# Patient Record
Sex: FEMALE | Race: WHITE | Employment: OTHER | URBAN - METROPOLITAN AREA
[De-identification: names, ages, dates, MRNs, and addresses within clinical notes are randomized per-mention and may not be internally consistent; named-entity substitution may affect disease eponyms.]

---

## 2018-04-27 PROBLEM — R32 URINARY INCONTINENCE: Status: ACTIVE | Noted: 2017-01-19

## 2018-05-10 PROBLEM — E03.9 ACQUIRED HYPOTHYROIDISM: Status: ACTIVE | Noted: 2018-05-10

## 2018-05-10 PROBLEM — K21.9 GASTROESOPHAGEAL REFLUX DISEASE WITHOUT ESOPHAGITIS: Status: ACTIVE | Noted: 2018-05-10

## 2018-05-10 PROBLEM — E11.22 TYPE 2 DIABETES MELLITUS WITH STAGE 3 CHRONIC KIDNEY DISEASE, WITHOUT LONG-TERM CURRENT USE OF INSULIN (HCC): Status: ACTIVE | Noted: 2018-05-10

## 2018-05-10 PROBLEM — E78.2 MIXED HYPERLIPIDEMIA: Status: ACTIVE | Noted: 2018-05-10

## 2018-05-10 PROBLEM — N18.30 TYPE 2 DIABETES MELLITUS WITH STAGE 3 CHRONIC KIDNEY DISEASE, WITHOUT LONG-TERM CURRENT USE OF INSULIN (HCC): Status: ACTIVE | Noted: 2018-05-10

## 2018-05-10 PROBLEM — I10 ESSENTIAL HYPERTENSION: Status: ACTIVE | Noted: 2018-05-10

## 2018-07-16 ENCOUNTER — HOSPITAL ENCOUNTER (OUTPATIENT)
Age: 73
Setting detail: SPECIMEN
Discharge: HOME | End: 2018-07-16
Payer: MEDICARE

## 2018-07-16 DIAGNOSIS — R19.7 DIARRHEA, UNSPECIFIED TYPE: ICD-10-CM

## 2018-07-16 LAB
CAMPYLOBACTER PCR: NORMAL
DATE, STOOL #1: NORMAL
DATE, STOOL #2: NORMAL
DATE, STOOL #3: NORMAL
HEMOCCULT SP1 STL QL: NEGATIVE
HEMOCCULT SP2 STL QL: NORMAL
HEMOCCULT SP3 STL QL: NORMAL
SALMONELLA PCR: NORMAL
SHIGATOXIN GENE PCR: NORMAL
SHIGELLA SP PCR: NORMAL
SPECIMEN: NORMAL
TIME, STOOL #1: 400
TIME, STOOL #2: NORMAL
TIME, STOOL #3: NORMAL

## 2018-11-26 ENCOUNTER — HOSPITAL ENCOUNTER (OUTPATIENT)
Dept: GENERAL RADIOLOGY | Facility: CLINIC | Age: 73
Discharge: HOME | End: 2018-11-28
Payer: MEDICARE

## 2018-11-26 DIAGNOSIS — G89.29 CHRONIC PAIN OF LEFT KNEE: ICD-10-CM

## 2018-11-26 DIAGNOSIS — M25.562 CHRONIC PAIN OF LEFT KNEE: ICD-10-CM

## 2018-11-26 PROCEDURE — 73564 X-RAY EXAM KNEE 4 OR MORE: CPT

## 2018-12-07 ENCOUNTER — HOSPITAL ENCOUNTER (OUTPATIENT)
Age: 73
Setting detail: SPECIMEN
Discharge: HOME | End: 2018-12-07
Payer: MEDICARE

## 2018-12-07 DIAGNOSIS — R30.0 DYSURIA: ICD-10-CM

## 2018-12-07 DIAGNOSIS — N30.01 ACUTE CYSTITIS WITH HEMATURIA: ICD-10-CM

## 2018-12-08 LAB
CULTURE: ABNORMAL
Lab: ABNORMAL
ORGANISM: ABNORMAL
SPECIMEN DESCRIPTION: ABNORMAL
STATUS: ABNORMAL

## 2018-12-27 ENCOUNTER — HOSPITAL ENCOUNTER (OUTPATIENT)
Age: 73
Setting detail: SPECIMEN
Discharge: HOME | End: 2018-12-27
Payer: MEDICARE

## 2018-12-27 LAB
ANION GAP SERPL CALCULATED.3IONS-SCNC: 13 MMOL/L (ref 9–17)
BUN BLDV-MCNC: 70 MG/DL (ref 8–23)
BUN/CREAT BLD: ABNORMAL (ref 9–20)
CALCIUM SERPL-MCNC: 9.2 MG/DL (ref 8.6–10.4)
CHLORIDE BLD-SCNC: 109 MMOL/L (ref 98–107)
CO2: 20 MMOL/L (ref 20–31)
CREAT SERPL-MCNC: 1.98 MG/DL (ref 0.5–0.9)
GFR AFRICAN AMERICAN: 30 ML/MIN
GFR NON-AFRICAN AMERICAN: 25 ML/MIN
GFR SERPL CREATININE-BSD FRML MDRD: ABNORMAL ML/MIN/{1.73_M2}
GFR SERPL CREATININE-BSD FRML MDRD: ABNORMAL ML/MIN/{1.73_M2}
GLUCOSE BLD-MCNC: 267 MG/DL (ref 70–99)
POTASSIUM SERPL-SCNC: 5.3 MMOL/L (ref 3.7–5.3)
SODIUM BLD-SCNC: 142 MMOL/L (ref 135–144)

## 2019-02-04 ENCOUNTER — HOSPITAL ENCOUNTER (OUTPATIENT)
Age: 74
Setting detail: SPECIMEN
Discharge: HOME | End: 2019-02-04
Payer: MEDICARE

## 2019-02-04 DIAGNOSIS — E03.9 ACQUIRED HYPOTHYROIDISM: ICD-10-CM

## 2019-02-04 DIAGNOSIS — E11.22 TYPE 2 DIABETES MELLITUS WITH STAGE 3 CHRONIC KIDNEY DISEASE, WITHOUT LONG-TERM CURRENT USE OF INSULIN (HCC): ICD-10-CM

## 2019-02-04 DIAGNOSIS — N18.30 TYPE 2 DIABETES MELLITUS WITH STAGE 3 CHRONIC KIDNEY DISEASE, WITHOUT LONG-TERM CURRENT USE OF INSULIN (HCC): ICD-10-CM

## 2019-02-04 LAB
ESTIMATED AVERAGE GLUCOSE: 171 MG/DL
HBA1C MFR BLD: 7.6 % (ref 4–6)
TSH SERPL DL<=0.05 MIU/L-ACNC: 3.71 MIU/L (ref 0.3–5)

## 2019-08-28 ENCOUNTER — HOSPITAL ENCOUNTER (OUTPATIENT)
Age: 74
Setting detail: SPECIMEN
Discharge: HOME | End: 2019-08-28
Payer: MEDICARE

## 2019-08-28 DIAGNOSIS — I10 ESSENTIAL HYPERTENSION: ICD-10-CM

## 2019-08-28 DIAGNOSIS — E03.9 ACQUIRED HYPOTHYROIDISM: ICD-10-CM

## 2019-08-28 DIAGNOSIS — E11.22 TYPE 2 DIABETES MELLITUS WITH STAGE 3 CHRONIC KIDNEY DISEASE, WITHOUT LONG-TERM CURRENT USE OF INSULIN (HCC): ICD-10-CM

## 2019-08-28 DIAGNOSIS — N18.30 TYPE 2 DIABETES MELLITUS WITH STAGE 3 CHRONIC KIDNEY DISEASE, WITHOUT LONG-TERM CURRENT USE OF INSULIN (HCC): ICD-10-CM

## 2019-08-28 LAB
-: ABNORMAL
AMORPHOUS: ABNORMAL
ANION GAP SERPL CALCULATED.3IONS-SCNC: 14 MMOL/L (ref 9–17)
ANION GAP SERPL CALCULATED.3IONS-SCNC: 15 MMOL/L (ref 9–17)
BACTERIA: ABNORMAL
BILIRUBIN URINE: NEGATIVE
BUN BLDV-MCNC: 46 MG/DL (ref 8–23)
BUN BLDV-MCNC: 47 MG/DL (ref 8–23)
BUN/CREAT BLD: ABNORMAL (ref 9–20)
BUN/CREAT BLD: ABNORMAL (ref 9–20)
CALCIUM SERPL-MCNC: 9.4 MG/DL (ref 8.6–10.4)
CALCIUM SERPL-MCNC: 9.4 MG/DL (ref 8.6–10.4)
CASTS UA: ABNORMAL /LPF (ref 0–8)
CHLORIDE BLD-SCNC: 107 MMOL/L (ref 98–107)
CHLORIDE BLD-SCNC: 111 MMOL/L (ref 98–107)
CO2: 18 MMOL/L (ref 20–31)
CO2: 19 MMOL/L (ref 20–31)
COLOR: YELLOW
COMMENT UA: ABNORMAL
CREAT SERPL-MCNC: 2.06 MG/DL (ref 0.5–0.9)
CREAT SERPL-MCNC: 2.11 MG/DL (ref 0.5–0.9)
CRYSTALS, UA: ABNORMAL /HPF
EPITHELIAL CELLS UA: ABNORMAL /HPF (ref 0–5)
ESTIMATED AVERAGE GLUCOSE: 174 MG/DL
GFR AFRICAN AMERICAN: 28 ML/MIN
GFR AFRICAN AMERICAN: 28 ML/MIN
GFR NON-AFRICAN AMERICAN: 23 ML/MIN
GFR NON-AFRICAN AMERICAN: 23 ML/MIN
GFR SERPL CREATININE-BSD FRML MDRD: ABNORMAL ML/MIN/{1.73_M2}
GLUCOSE BLD-MCNC: 131 MG/DL (ref 70–99)
GLUCOSE BLD-MCNC: 134 MG/DL (ref 70–99)
GLUCOSE URINE: NEGATIVE
HBA1C MFR BLD: 7.7 % (ref 4–6)
HCT VFR BLD CALC: 34.8 % (ref 36.3–47.1)
HEMOGLOBIN: 11 G/DL (ref 11.9–15.1)
KETONES, URINE: NEGATIVE
LEUKOCYTE ESTERASE, URINE: ABNORMAL
MCH RBC QN AUTO: 29.5 PG (ref 25.2–33.5)
MCHC RBC AUTO-ENTMCNC: 31.6 G/DL (ref 28.4–34.8)
MCV RBC AUTO: 93.3 FL (ref 82.6–102.9)
MUCUS: ABNORMAL
NITRITE, URINE: NEGATIVE
NRBC AUTOMATED: 0 PER 100 WBC
OTHER OBSERVATIONS UA: ABNORMAL
PDW BLD-RTO: 13.5 % (ref 11.8–14.4)
PH UA: 5 (ref 5–8)
PHOSPHORUS: 3.4 MG/DL (ref 2.6–4.5)
PLATELET # BLD: ABNORMAL K/UL (ref 138–453)
PLATELET, FLUORESCENCE: 222 K/UL (ref 138–453)
PLATELET, IMMATURE FRACTION: 6.8 % (ref 1.1–10.3)
PMV BLD AUTO: ABNORMAL FL (ref 8.1–13.5)
POTASSIUM SERPL-SCNC: 4.6 MMOL/L (ref 3.7–5.3)
POTASSIUM SERPL-SCNC: 4.7 MMOL/L (ref 3.7–5.3)
PROTEIN UA: ABNORMAL
PTH INTACT: 44.56 PG/ML (ref 15–65)
RBC # BLD: 3.73 M/UL (ref 3.95–5.11)
RBC UA: ABNORMAL /HPF (ref 0–4)
RENAL EPITHELIAL, UA: ABNORMAL /HPF
SODIUM BLD-SCNC: 140 MMOL/L (ref 135–144)
SODIUM BLD-SCNC: 144 MMOL/L (ref 135–144)
SPECIFIC GRAVITY UA: 1.01 (ref 1–1.03)
TRICHOMONAS: ABNORMAL
TSH SERPL DL<=0.05 MIU/L-ACNC: 3.8 MIU/L (ref 0.3–5)
TURBIDITY: CLEAR
URINE HGB: NEGATIVE
UROBILINOGEN, URINE: NORMAL
WBC # BLD: 6.3 K/UL (ref 3.5–11.3)
WBC UA: ABNORMAL /HPF (ref 0–5)
YEAST: ABNORMAL

## 2019-08-29 LAB
CULTURE: NORMAL
Lab: NORMAL
SPECIMEN DESCRIPTION: NORMAL

## 2020-07-13 ENCOUNTER — HOSPITAL ENCOUNTER (EMERGENCY)
Facility: CLINIC | Age: 75
Discharge: HOME | End: 2020-07-13
Attending: EMERGENCY MEDICINE
Payer: MEDICARE

## 2020-07-13 ENCOUNTER — APPOINTMENT (OUTPATIENT)
Dept: ULTRASOUND IMAGING | Facility: CLINIC | Age: 75
End: 2020-07-13
Payer: MEDICARE

## 2020-07-13 VITALS
OXYGEN SATURATION: 97 % | DIASTOLIC BLOOD PRESSURE: 56 MMHG | BODY MASS INDEX: 27.16 KG/M2 | HEART RATE: 63 BPM | TEMPERATURE: 99.2 F | SYSTOLIC BLOOD PRESSURE: 162 MMHG | HEIGHT: 65 IN | WEIGHT: 163 LBS | RESPIRATION RATE: 16 BRPM

## 2020-07-13 PROCEDURE — 99283 EMERGENCY DEPT VISIT LOW MDM: CPT

## 2020-07-13 PROCEDURE — 93970 EXTREMITY STUDY: CPT

## 2020-07-13 RX ORDER — FUROSEMIDE 80 MG
80 TABLET ORAL DAILY
COMMUNITY
End: 2021-03-15 | Stop reason: ALTCHOICE

## 2020-07-13 NOTE — ED PROVIDER NOTES
[facility]  [facility]  Phone: [phone]      Pt Name: Lakshmi King  MTL:[id number]  [place] 1945  Date of evaluation: 7/13/2020      CHIEF COMPLAINT       Chief Complaint   Patient presents with    Leg Pain     Right leg. No hx of DVT. Sent in by PCP for rule out of blood clot.  Leg Swelling     Bilateral leg swelling. HISTORY OF PRESENT ILLNESS   Lakshmi King is a 66 y.o. female with history of type 2 diabetes, diabetic neuropathy, osteoarthritis, hypertension, hypothyroidism, CKD, and back surgery who presents for evaluation of bilateral acute on chronic lower extremity edema. The patient reports that she has had chronic swelling to her legs for years and is prescribed Lasix. She states that she saw Dr. Faustino Carson approximately 1 month ago for worsening of her lower extremity edema and her Lasix was increased to 80 mg daily. She does not wear compression socks. The patient states that she has not had any improvement in her lower extremity edema and today noticed some firmness to her right posterior calf which is tender to palpation. She called her PCP, Dr. Mukesh Cuadra, and was referred to the emergency department to rule out DVT. The patient has not taken any medications for pain and does not list any provoking or palliating factors. She denies any personal or family history of blood clots. The patient denies fever, chills, headache, vision changes, neck pain, back pain, chest pain, shortness of breath, abdominal pain, urinary/bowel symptoms, focal weakness, hemoptysis, estrogen use, lower extremity injury, recent injury or illness. REVIEW OF SYSTEMS     Ten point review of systems was reviewed and is negative unless otherwise noted in the HPI    [address]    has a past medical history of Diabetes mellitus ([facility] [address] Hypertension, and Thyroid disease. SURGICAL HISTORY      has a past surgical history that includes back surgery;  Carpal tunnel release; Foot surgery; and lipoma resection. CURRENT MEDICATIONS       Discharge Medication List as of 7/13/2020  9:02 PM      CONTINUE these medications which have NOT CHANGED    Details   furosemide (LASIX) 80 MG tablet Take 80 mg by mouth dailyHistorical Med      levothyroxine (SYNTHROID) 88 MCG tablet Take 1 tablet by mouth daily, Disp-90 tablet,R-0Normal      simvastatin (ZOCOR) 20 MG tablet TAKE 1 TABLET BY MOUTH  NIGHTLY, Disp-90 tablet,R-0Normal      chlorthalidone (HYGROTON) 25 MG tablet TAKE 1 TABLET MY MOUTH  DAILY. , Disp-60 tablet,R-0Normal      tiZANidine (ZANAFLEX) 4 MG tablet Take 1 tablet by mouth 2 times daily as needed (muscle spasm), Disp-30 tablet, R-0Normal      glipiZIDE (GLUCOTROL XL) 10 MG extended release tablet TAKE ONE TABLET BY MOUTH  EVERY MORNING AND TAKE ONE  TABLET BY MOUTH ONCE  NIGHTLY, Disp-26 tablet, R-60Normal      pregabalin (LYRICA) 150 MG capsule Take 1 capsule by mouth daily for 90 days. , Disp-90 capsule, R-0Normal      Liraglutide (VICTOZA) 18 MG/3ML SOPN SC injection Inject 0.6 mg into the skin daily, Disp-3 pen, R-1Normal      Insulin Degludec (TRESIBA FLEXTOUCH) 100 UNIT/ML SOPN Inject 10 Units into the skin daily, Disp-2 pen, R-1Normal      Elastic Bandages & Supports (JOBST ACTIVE 15-20MMHG X-LARGE) MISC DAILY Starting Wed 11/13/2019, Disp-1 each, R-0, Print      Blood Glucose Monitoring Suppl (ONE TOUCH ULTRA 2) w/Device KIT DAILY Starting Mon 10/7/2019, Disp-1 kit, R-0, Normal      ranitidine (ZANTAC) 150 MG tablet Historical Med      ferrous sulfate 325 (65 Fe) MG tablet Take 325 mg by mouth daily (with breakfast)Historical Med      blood glucose monitor strips Test 3 times a day & as needed for symptoms of irregular blood glucose., Disp-100 strip, R-2, Normal      glucose monitoring kit (FREESTYLE) monitoring kit DAILY Starting Wed 4/24/2019, Disp-1 kit, R-0, Normal      SITagliptin (JANUVIA) 25 MG tablet Take 1 tablet by mouth daily, Disp-90 tablet, R-0Normal      labetalol (NORMODYNE) 100 MG tablet Historical Med      NIFEdipine (PROCARDIA XL) 90 MG extended release tablet Historical Med      aspirin 81 MG tablet Take 81 mg by mouth dailyHistorical Med             ALLERGIES     is allergic to hepatitis b virus vaccines and amlodipine besylate. FAMILY HISTORY     She indicated that the status of her mother is unknown. She indicated that the status of her brother is unknown.     family history includes Cancer in her brother; Diabetes in her mother; Other in her mother. SOCIAL HISTORY      reports that she has never smoked. She has never used smokeless tobacco. She reports that she does not drink alcohol or use drugs. I counseled the patient against using tobacco products. PHYSICAL EXAM     INITIAL VITALS:  height is 5' 5\" (1.651 m) and weight is 73.9 kg (163 lb). Her oral temperature is 99.2 °F (37.3 °C). Her blood pressure is 162/56 (abnormal) and her pulse is 63. Her respiration is 16 and oxygen saturation is 97%. CONSTITUTIONAL: no apparent distress, well appearing  SKIN: warm, dry, no jaundice, hives or petechiae  EYES: clear conjunctiva, non-icteric sclera  HENT: normocephalic, atraumatic, moist mucus membranes  NECK: Nontender and supple with no nuchal rigidity, full range of motion  PULMONARY: clear to auscultation without wheezes, rhonchi, or rales, normal excursion, no accessory muscle use and no stridor  CARDIOVASCULAR: regular rate, rhythm. Strong radial pulses with intact distal perfusion. Capillary refill <2 seconds. GASTROINTESTINAL: soft, non-tender, non-distended, no palpable masses, no rebound or guarding   GENITOURINARY: No costovertebral angle tenderness to palpation  MUSCULOSKELETAL: No midline spinal tenderness, step off or deformity. Extremities are otherwise nontender to palpation and nonerythematous. Compartments soft. 2+ pitting lower extremity edema with mild tenderness to palpation. No palpable cords.   DP/PT/popliteal pulses 2+/4 and equal bilaterally. Strength 5/5 with dorsi and plantar flexion of the bilateral feet. NEUROLOGIC: alert and oriented x 3, GCS 15, normal mentation and speech. Moves all extremities x 4 without motor or sensory deficit, gait is stable without ataxia  PSYCHIATRIC: normal mood and affect, thought process is clear and linear    DIAGNOSTIC RESULTS     EKG:  None    RADIOLOGY:   Us Dup Lower Extremities Bilateral Venous    Result Date: 7/13/2020  EXAMINATION: DUPLEX VENOUS ULTRASOUND OF THE BILATERAL LOWER EXTREMITIES, 7/13/2020 8:07 pm TECHNIQUE: Duplex ultrasound and Doppler images were obtained of the bilateral lower extremities COMPARISON: None. HISTORY: ORDERING SYSTEM PROVIDED HISTORY: pain and firmness to posterior right calf, bilateral lower extremity edema TECHNOLOGIST PROVIDED HISTORY: pain and firmness to posterior right calf, bilateral lower extremity edema Reason for Exam: Bilateral leg edema, left calf pain Acuity: Acute Type of Exam: Initial FINDINGS: The visualized veins of the bilateral lower extremities are patent and free of echogenic thrombus. The veins are normally compressible and have normal phasic flow. Subcutaneous edema bilaterally. Left Okeefe's cyst measures 6.8 x 3 x 3.4 cm. Baker's cyst on the left. No DVT. LABS:  No results found for this visit on 07/13/20. EMERGENCY DEPARTMENT COURSE:        The patient was given the following medications:  No orders of the defined types were placed in this encounter.        Vitals:    Vitals:    07/13/20 1908   BP: (!) 162/56   Pulse: 63   Resp: 16   Temp: 99.2 °F (37.3 °C)   TempSrc: Oral   SpO2: 97%   Weight: 73.9 kg (163 lb)   Height: 5' 5\" (1.651 m)     -------------------------  BP: (!) 162/56, Temp: 99.2 °F (37.3 °C), Pulse: 63, Resp: 16    CONSULTS:  None    CRITICAL CARE:   None    PROCEDURES:  None    DIAGNOSIS/ MDM:   Dahlia Chacon is a 66 y.o. female who presents with acute on chronic bilateral lower extremity edema.  Vital signs are stable. Heart regular rate and rhythm. Lungs clear to auscultation. Abdomen soft and nontender. The patient has 2+ pitting edema to the bilateral lower extremities. She is neurovascularly intact. She is concerned about a firm region to her right posterior calf but I am unable to palpate this. Venous Doppler scan of the bilateral lower extremities shows no signs of DVT. She does have incidental finding of left Baker's cyst.  I suspect the patient's edema is chronic in nature. She was instructed to talk to Dr. Jim Ramos about her Lasix dosing and to follow-up with PCP, Dr. Jad Santos, in 2 days. I updated Dr. Jad Santos on the patient's presentation and results. She does not request any further imaging or laboratory studies at this time. I have low suspicion for fracture, dislocation, infection, compartment syndrome, or blood clot. She was instructed to take ibuprofen or Tylenol as needed for pain and to wear compression socks. She was told to continue her Lasix and try to keep her legs elevated as much as possible. She was instructed to return to the emergency department for worsening symptoms or any other concern. The patient understands that at this time there is no evidence for a more malignant underlying process, but also understands that early in the process of an illness or injury, and emergency department work-up can be falsely reassuring. Routine discharge counseling was given, and the patient understands that worsening, changing or persistent symptoms should prompt a immediate call or follow-up with their primary care physician or return to the emergency department. The importance of appropriate follow-up was also discussed. I have reviewed the disposition diagnosis with the patient. I have answered their questions and given discharge instructions. They voiced understanding of these instructions and did not have any further questions or complaints.       FINAL IMPRESSION 1. Bilateral lower extremity edema    2.  Baker's cyst of knee, left          DISPOSITION/PLAN   DISPOSITION Decision To Discharge 07/13/2020 09:00:51 PM        PATIENT REFERRED TO:  Nena Castañeda DO  [address]  [phone]    Schedule an appointment as soon as possible for a visit in 2 days      [facility]  [address]  [phone]  Go to   If symptoms worsen      DISCHARGE MEDICATIONS:  Discharge Medication List as of 7/13/2020  9:02 PM          (Please note that portions of this note were completed with a voice recognitionprogram.  Efforts were made to edit the dictations but occasionally words are mis-transcribed.)    [address]  Emergency Physician Attending         Kira Saucedo DO  07/14/20 010

## 2020-07-20 ENCOUNTER — HOSPITAL ENCOUNTER (OUTPATIENT)
Age: 75
Setting detail: SPECIMEN
Discharge: HOME | End: 2020-07-20
Payer: MEDICARE

## 2020-07-20 LAB
ABSOLUTE EOS #: 0.25 K/UL (ref 0–0.44)
ABSOLUTE IMMATURE GRANULOCYTE: 0.03 K/UL (ref 0–0.3)
ABSOLUTE LYMPH #: 2.71 K/UL (ref 1.1–3.7)
ABSOLUTE MONO #: 0.65 K/UL (ref 0.1–1.2)
ANION GAP SERPL CALCULATED.3IONS-SCNC: 18 MMOL/L (ref 9–17)
BASOPHILS # BLD: 0 % (ref 0–2)
BASOPHILS ABSOLUTE: 0.03 K/UL (ref 0–0.2)
BUN BLDV-MCNC: 49 MG/DL (ref 8–23)
BUN/CREAT BLD: ABNORMAL (ref 9–20)
CALCIUM IONIZED: 1.26 MMOL/L (ref 1.13–1.33)
CALCIUM SERPL-MCNC: 9.7 MG/DL (ref 8.6–10.4)
CHLORIDE BLD-SCNC: 101 MMOL/L (ref 98–107)
CO2: 21 MMOL/L (ref 20–31)
CREAT SERPL-MCNC: 2.29 MG/DL (ref 0.5–0.9)
DIFFERENTIAL TYPE: ABNORMAL
EOSINOPHILS RELATIVE PERCENT: 3 % (ref 1–4)
ESTIMATED AVERAGE GLUCOSE: 263 MG/DL
GFR AFRICAN AMERICAN: 25 ML/MIN
GFR NON-AFRICAN AMERICAN: 21 ML/MIN
GFR SERPL CREATININE-BSD FRML MDRD: ABNORMAL ML/MIN/{1.73_M2}
GFR SERPL CREATININE-BSD FRML MDRD: ABNORMAL ML/MIN/{1.73_M2}
GLUCOSE BLD-MCNC: 207 MG/DL (ref 70–99)
HBA1C MFR BLD: 10.8 % (ref 4–6)
HCT VFR BLD CALC: 36 % (ref 36.3–47.1)
HEMOGLOBIN: 11.7 G/DL (ref 11.9–15.1)
IMMATURE GRANULOCYTES: 0 %
LYMPHOCYTES # BLD: 36 % (ref 24–43)
MAGNESIUM: 1.7 MG/DL (ref 1.6–2.6)
MCH RBC QN AUTO: 29.1 PG (ref 25.2–33.5)
MCHC RBC AUTO-ENTMCNC: 32.5 G/DL (ref 28.4–34.8)
MCV RBC AUTO: 89.6 FL (ref 82.6–102.9)
MONOCYTES # BLD: 9 % (ref 3–12)
NRBC AUTOMATED: 0 PER 100 WBC
PDW BLD-RTO: 13.3 % (ref 11.8–14.4)
PHOSPHORUS: 3.5 MG/DL (ref 2.6–4.5)
PLATELET # BLD: ABNORMAL K/UL (ref 138–453)
PLATELET ESTIMATE: ABNORMAL
PLATELET, FLUORESCENCE: 213 K/UL (ref 138–453)
PLATELET, IMMATURE FRACTION: 8.6 % (ref 1.1–10.3)
PMV BLD AUTO: ABNORMAL FL (ref 8.1–13.5)
POTASSIUM SERPL-SCNC: 4.3 MMOL/L (ref 3.7–5.3)
PTH INTACT: 80.36 PG/ML (ref 15–65)
RBC # BLD: 4.02 M/UL (ref 3.95–5.11)
RBC # BLD: ABNORMAL 10*6/UL
SEG NEUTROPHILS: 51 % (ref 36–65)
SEGMENTED NEUTROPHILS ABSOLUTE COUNT: 3.87 K/UL (ref 1.5–8.1)
SODIUM BLD-SCNC: 140 MMOL/L (ref 135–144)
TSH SERPL DL<=0.05 MIU/L-ACNC: 8.18 MIU/L (ref 0.3–5)
VITAMIN D 25-HYDROXY: 24.2 NG/ML (ref 30–100)
WBC # BLD: 7.5 K/UL (ref 3.5–11.3)
WBC # BLD: ABNORMAL 10*3/UL

## 2020-09-28 ENCOUNTER — HOSPITAL ENCOUNTER (OUTPATIENT)
Age: 75
Setting detail: SPECIMEN
Discharge: HOME | End: 2020-09-28
Payer: MEDICARE

## 2020-09-28 LAB
-: ABNORMAL
AMORPHOUS: ABNORMAL
ANION GAP SERPL CALCULATED.3IONS-SCNC: 16 MMOL/L (ref 9–17)
BACTERIA: ABNORMAL
BILIRUBIN URINE: NEGATIVE
BUN BLDV-MCNC: 59 MG/DL (ref 8–23)
BUN/CREAT BLD: ABNORMAL (ref 9–20)
CALCIUM SERPL-MCNC: 9.5 MG/DL (ref 8.6–10.4)
CASTS UA: ABNORMAL /LPF (ref 0–2)
CHLORIDE BLD-SCNC: 104 MMOL/L (ref 98–107)
CO2: 24 MMOL/L (ref 20–31)
COLOR: ABNORMAL
COMMENT UA: ABNORMAL
CREAT SERPL-MCNC: 2 MG/DL (ref 0.5–0.9)
CRYSTALS, UA: ABNORMAL /HPF
EPITHELIAL CELLS UA: ABNORMAL /HPF (ref 0–5)
GFR AFRICAN AMERICAN: 29 ML/MIN
GFR NON-AFRICAN AMERICAN: 24 ML/MIN
GFR SERPL CREATININE-BSD FRML MDRD: ABNORMAL ML/MIN/{1.73_M2}
GFR SERPL CREATININE-BSD FRML MDRD: ABNORMAL ML/MIN/{1.73_M2}
GLUCOSE BLD-MCNC: 123 MG/DL (ref 70–99)
GLUCOSE URINE: NEGATIVE
KETONES, URINE: NEGATIVE
LEUKOCYTE ESTERASE, URINE: ABNORMAL
MUCUS: ABNORMAL
NITRITE, URINE: NEGATIVE
OTHER OBSERVATIONS UA: ABNORMAL
PH UA: 5.5 (ref 5–8)
PHOSPHORUS: 4.1 MG/DL (ref 2.6–4.5)
POTASSIUM SERPL-SCNC: 3.5 MMOL/L (ref 3.7–5.3)
PROTEIN UA: ABNORMAL
PTH INTACT: 109.7 PG/ML (ref 15–65)
RBC UA: ABNORMAL /HPF (ref 0–2)
RENAL EPITHELIAL, UA: ABNORMAL /HPF
SODIUM BLD-SCNC: 144 MMOL/L (ref 135–144)
SPECIFIC GRAVITY UA: 1.02 (ref 1–1.03)
TRICHOMONAS: ABNORMAL
TSH SERPL DL<=0.05 MIU/L-ACNC: 1.43 MIU/L (ref 0.3–5)
TURBIDITY: ABNORMAL
URINE HGB: ABNORMAL
UROBILINOGEN, URINE: NORMAL
WBC UA: ABNORMAL /HPF (ref 0–5)
YEAST: ABNORMAL

## 2020-09-30 LAB
CULTURE: ABNORMAL
Lab: ABNORMAL
SPECIMEN DESCRIPTION: ABNORMAL

## 2021-01-20 ENCOUNTER — TELEPHONE (OUTPATIENT)
Dept: GASTROENTEROLOGY | Age: 76
End: 2021-01-20

## 2021-02-24 ENCOUNTER — HOSPITAL ENCOUNTER (EMERGENCY)
Facility: CLINIC | Age: 76
Discharge: ANOTHER HOSPITAL | End: 2021-02-24
Attending: EMERGENCY MEDICINE
Payer: MEDICARE

## 2021-02-24 ENCOUNTER — APPOINTMENT (OUTPATIENT)
Dept: GENERAL RADIOLOGY | Facility: CLINIC | Age: 76
End: 2021-02-24
Payer: MEDICARE

## 2021-02-24 VITALS
RESPIRATION RATE: 20 BRPM | TEMPERATURE: 99 F | DIASTOLIC BLOOD PRESSURE: 80 MMHG | OXYGEN SATURATION: 79 % | HEIGHT: 65 IN | HEART RATE: 88 BPM | SYSTOLIC BLOOD PRESSURE: 122 MMHG | WEIGHT: 60 LBS | BODY MASS INDEX: 10 KG/M2

## 2021-02-24 DIAGNOSIS — I21.4 NSTEMI (NON-ST ELEVATED MYOCARDIAL INFARCTION) (HCC): ICD-10-CM

## 2021-02-24 DIAGNOSIS — D64.9 ANEMIA, UNSPECIFIED TYPE: ICD-10-CM

## 2021-02-24 DIAGNOSIS — I50.30 DIASTOLIC CONGESTIVE HEART FAILURE, UNSPECIFIED HF CHRONICITY (HCC): Primary | ICD-10-CM

## 2021-02-24 LAB
-: NORMAL
ABSOLUTE EOS #: 0 K/UL (ref 0–0.4)
ABSOLUTE IMMATURE GRANULOCYTE: ABNORMAL K/UL (ref 0–0.3)
ABSOLUTE LYMPH #: 1.9 K/UL (ref 1–4.8)
ABSOLUTE MONO #: 0.6 K/UL (ref 0.1–1.2)
ALBUMIN SERPL-MCNC: 3.4 G/DL (ref 3.5–5.2)
ALBUMIN/GLOBULIN RATIO: 1.1 (ref 1–2.5)
ALP BLD-CCNC: 112 U/L (ref 35–104)
ALT SERPL-CCNC: 15 U/L (ref 5–33)
ANION GAP SERPL CALCULATED.3IONS-SCNC: 15 MMOL/L (ref 9–17)
AST SERPL-CCNC: 36 U/L
BASOPHILS # BLD: 0 % (ref 0–2)
BASOPHILS ABSOLUTE: 0 K/UL (ref 0–0.2)
BILIRUB SERPL-MCNC: 0.5 MG/DL (ref 0.3–1.2)
BNP INTERPRETATION: ABNORMAL
BUN BLDV-MCNC: 60 MG/DL (ref 8–23)
BUN/CREAT BLD: ABNORMAL (ref 9–20)
CALCIUM SERPL-MCNC: 7.6 MG/DL (ref 8.6–10.4)
CHLORIDE BLD-SCNC: 106 MMOL/L (ref 98–107)
CO2: 17 MMOL/L (ref 20–31)
CREAT SERPL-MCNC: 2.7 MG/DL (ref 0.5–0.9)
DIFFERENTIAL TYPE: ABNORMAL
EOSINOPHILS RELATIVE PERCENT: 0 % (ref 1–4)
GFR AFRICAN AMERICAN: 21 ML/MIN
GFR NON-AFRICAN AMERICAN: 17 ML/MIN
GFR SERPL CREATININE-BSD FRML MDRD: ABNORMAL ML/MIN/{1.73_M2}
GFR SERPL CREATININE-BSD FRML MDRD: ABNORMAL ML/MIN/{1.73_M2}
GLUCOSE BLD-MCNC: 259 MG/DL (ref 70–99)
HCT VFR BLD CALC: 25.6 % (ref 36–46)
HEMOGLOBIN: 8.4 G/DL (ref 12–16)
IMMATURE GRANULOCYTES: ABNORMAL %
INR BLD: 1.1
LACTIC ACID: 1 MMOL/L (ref 0.5–2.2)
LYMPHOCYTES # BLD: 31 % (ref 24–44)
MCH RBC QN AUTO: 27.8 PG (ref 26–34)
MCHC RBC AUTO-ENTMCNC: 32.9 G/DL (ref 31–37)
MCV RBC AUTO: 84.5 FL (ref 80–100)
MONOCYTES # BLD: 9 % (ref 2–11)
NRBC AUTOMATED: ABNORMAL PER 100 WBC
PDW BLD-RTO: 16.4 % (ref 12.5–15.4)
PLATELET # BLD: 133 K/UL (ref 140–450)
PLATELET ESTIMATE: ABNORMAL
PMV BLD AUTO: 11.4 FL (ref 6–12)
POTASSIUM SERPL-SCNC: 3.8 MMOL/L (ref 3.7–5.3)
PRO-BNP: ABNORMAL PG/ML
PROTHROMBIN TIME: 11.7 SEC (ref 9.4–12.6)
RBC # BLD: 3.03 M/UL (ref 4–5.2)
RBC # BLD: ABNORMAL 10*6/UL
REASON FOR REJECTION: NORMAL
SARS-COV-2, RAPID: NOT DETECTED
SEG NEUTROPHILS: 60 % (ref 36–66)
SEGMENTED NEUTROPHILS ABSOLUTE COUNT: 3.7 K/UL (ref 1.8–7.7)
SODIUM BLD-SCNC: 138 MMOL/L (ref 135–144)
SPECIMEN DESCRIPTION: NORMAL
TOTAL PROTEIN: 6.4 G/DL (ref 6.4–8.3)
TROPONIN INTERP: ABNORMAL
TROPONIN INTERP: ABNORMAL
TROPONIN T: ABNORMAL NG/ML
TROPONIN T: ABNORMAL NG/ML
TROPONIN, HIGH SENSITIVITY: 517 NG/L (ref 0–14)
TROPONIN, HIGH SENSITIVITY: 546 NG/L (ref 0–14)
WBC # BLD: 6.2 K/UL (ref 3.5–11)
WBC # BLD: ABNORMAL 10*3/UL
ZZ NTE CLEAN UP: ORDERED TEST: NORMAL
ZZ NTE WITH NAME CLEAN UP: SPECIMEN SOURCE: NORMAL

## 2021-02-24 PROCEDURE — 80053 COMPREHEN METABOLIC PANEL: CPT

## 2021-02-24 PROCEDURE — U0002 COVID-19 LAB TEST NON-CDC: HCPCS

## 2021-02-24 PROCEDURE — 85025 COMPLETE CBC W/AUTO DIFF WBC: CPT

## 2021-02-24 PROCEDURE — 84484 ASSAY OF TROPONIN QUANT: CPT

## 2021-02-24 PROCEDURE — 83880 ASSAY OF NATRIURETIC PEPTIDE: CPT

## 2021-02-24 PROCEDURE — 99285 EMERGENCY DEPT VISIT HI MDM: CPT

## 2021-02-24 PROCEDURE — 36415 COLL VENOUS BLD VENIPUNCTURE: CPT

## 2021-02-24 PROCEDURE — 85610 PROTHROMBIN TIME: CPT

## 2021-02-24 PROCEDURE — 96374 THER/PROPH/DIAG INJ IV PUSH: CPT

## 2021-02-24 PROCEDURE — 6360000002 HC RX W HCPCS: Performed by: EMERGENCY MEDICINE

## 2021-02-24 PROCEDURE — 83605 ASSAY OF LACTIC ACID: CPT

## 2021-02-24 PROCEDURE — 71045 X-RAY EXAM CHEST 1 VIEW: CPT

## 2021-02-24 PROCEDURE — 87040 BLOOD CULTURE FOR BACTERIA: CPT

## 2021-02-24 PROCEDURE — 93005 ELECTROCARDIOGRAM TRACING: CPT | Performed by: EMERGENCY MEDICINE

## 2021-02-24 RX ORDER — FUROSEMIDE 10 MG/ML
20 INJECTION INTRAMUSCULAR; INTRAVENOUS ONCE
Status: COMPLETED | OUTPATIENT
Start: 2021-02-24 | End: 2021-02-24

## 2021-02-24 RX ADMIN — FUROSEMIDE 20 MG: 10 INJECTION, SOLUTION INTRAVENOUS at 21:00

## 2021-02-24 ASSESSMENT — ENCOUNTER SYMPTOMS
ABDOMINAL PAIN: 0
BACK PAIN: 0
COUGH: 1
NAUSEA: 0
VOMITING: 0
SHORTNESS OF BREATH: 1
DIARRHEA: 0
EYE PAIN: 0

## 2021-02-24 NOTE — ED PROVIDER NOTES
[facility]  [facility]  Phone: [phone]        Pt Name: Karen Jones  MRN: [id number]  [place] 1945  Date of evaluation: 2/24/21      CHIEF COMPLAINT       Chief Complaint   Patient presents with    Shortness of Breath     x1 week    Cough    Fatigue         HISTORY OF PRESENT ILLNESS    Karen Jones is a 66 y.o. female who presents with complaints of increasing shortness of breath for the last week and more fatigue low-grade fever and a bit of a cough she had some peripheral edema but that is unusual for this but no chest pain no nausea no vomiting no recent travel or immobilization she did have cataract surgery end of last month. She does have a history of chronic renal failure but has not required dialysis also hypertension she is a non-smoker      REVIEW OF SYSTEMS         Review of Systems   Constitutional: Positive for fatigue and fever. Negative for chills. HENT: Negative for congestion and ear pain. Eyes: Negative for pain and visual disturbance. Respiratory: Positive for cough and shortness of breath. Cardiovascular: Positive for leg swelling. Negative for chest pain and palpitations. Gastrointestinal: Negative for abdominal pain, diarrhea, nausea and vomiting. Endocrine: Negative for polydipsia and polyuria. Genitourinary: Negative for difficulty urinating, dysuria, frequency, vaginal bleeding and vaginal discharge. Musculoskeletal: Negative for back pain, joint swelling, myalgias, neck pain and neck stiffness. Skin: Negative for rash. Neurological: Negative for dizziness, weakness and headaches. Hematological: Negative for adenopathy. Does not bruise/bleed easily. Psychiatric/Behavioral: Negative for confusion, self-injury and suicidal ideas. PAST MEDICAL HISTORY    has a past medical history of CRF (chronic renal failure), Diabetes mellitus ([facility] [address] Gout, Hyperlipidemia, Hypertension, and Thyroid disease.     SURGICAL HISTORY      has a past surgical history that includes back surgery; Carpal tunnel release; Foot surgery; lipoma resection; and Cataract removal.    CURRENT MEDICATIONS       Previous Medications    ALLOPURINOL (ZYLOPRIM) 300 MG TABLET    Take 1 tablet by mouth daily    ASPIRIN 81 MG TABLET    Take 81 mg by mouth daily    BLOOD GLUCOSE MONITOR STRIPS    Test 3 times a day & as needed for symptoms of irregular blood glucose.     BLOOD GLUCOSE MONITORING SUPPL (ONE TOUCH ULTRA 2) W/DEVICE KIT    1 kit by Does not apply route daily    COLCHICINE (COLCRYS) 0.6 MG TABLET    Take 1 tablet by mouth 2 times daily    ESOMEPRAZOLE (NEXIUM) 20 MG DELAYED RELEASE CAPSULE    Take 1 capsule by mouth every morning (before breakfast)    FUROSEMIDE (LASIX) 80 MG TABLET    Take 80 mg by mouth daily    GLIPIZIDE (GLUCOTROL XL) 10 MG EXTENDED RELEASE TABLET    TAKE ONE TABLET BY MOUTH  EVERY MORNING AND TAKE ONE  TABLET BY MOUTH ONCE  NIGHTLY    GLUCOSE MONITORING KIT (FREESTYLE) MONITORING KIT    1 kit by Does not apply route daily    GLYCERIN-HYPROMELLOSE- (HM DRY EYE RELIEF OP)    Apply to eye Indications: 1 gtt both eyes four times a day    HANDICAP PLACARD MISC    by Does not apply route EXPIRES IN 5 YEARS FROM ORIGINAL SCRIPT DATE    INDOMETHACIN (INDOCIN) 25 MG CAPSULE    Take 1 capsule by mouth daily    LABETALOL (NORMODYNE) 100 MG TABLET    100 mg 2 times daily     LEVOTHYROXINE (SYNTHROID) 100 MCG TABLET    Take 1 tablet by mouth daily    LOPERAMIDE (IMODIUM) 2 MG CAPSULE    Take 2 mg by mouth daily    NIFEDIPINE (PROCARDIA XL) 90 MG EXTENDED RELEASE TABLET    90 mg daily     PREGABALIN (LYRICA) 150 MG CAPSULE    TAKE ONE CAPSULE BY MOUTH DAILY    SIMVASTATIN (ZOCOR) 20 MG TABLET    TAKE 1 TABLET BY MOUTH AT  NIGHT    TRAVOPROST, BAK FREE, (TRAVATAN Z) 0.004 % SOLN OPHTHALMIC SOLUTION    Place 1 drop into both eyes nightly    VICTOZA 18 MG/3ML SOPN SC INJECTION    INJECT SUBCUTANEOUSLY 0.6MG DAILY       ALLERGIES     is allergic to hepatitis b virus vaccines; amlodipine besylate; and hepatitis b vaccine. FAMILY HISTORY     She indicated that her mother is . She indicated that her father is . She indicated that the status of her brother is unknown.     family history includes Cancer in her brother; Diabetes in her mother; Heart Disease in her father; Other in her mother. SOCIAL HISTORY      reports that she has never smoked. She has never used smokeless tobacco. She reports that she does not drink alcohol or use drugs. PHYSICAL EXAM     INITIAL VITALS:  height is 5' 5\" (1.651 m) and weight is 27.2 kg (60 lb). Her oral temperature is 99 °F (37.2 °C). Her blood pressure is 122/80 and her pulse is 88. Her respiration is 20 and oxygen saturation is 79% (abnormal). Physical Exam  Constitutional:       Appearance: She is well-developed. Comments: On arrival she is alert nontoxic despite having a sat of 79% on room air she appears good. ,  She was placed on O2 by nonrebreather went up to the mid 90s   HENT:      Head: Normocephalic and atraumatic. Right Ear: External ear normal.      Left Ear: External ear normal.   Eyes:      Conjunctiva/sclera: Conjunctivae normal.      Pupils: Pupils are equal, round, and reactive to light. Neck:      Musculoskeletal: Normal range of motion. Cardiovascular:      Rate and Rhythm: Normal rate and regular rhythm. Pulmonary:      Effort: Pulmonary effort is normal.      Breath sounds: Examination of the right-lower field reveals rales. Examination of the left-lower field reveals rales. Decreased breath sounds and rales present. No wheezing or rhonchi. Chest:      Chest wall: No mass. Abdominal:      General: Bowel sounds are normal.      Palpations: Abdomen is soft. There is no mass. Tenderness: There is no abdominal tenderness. There is no guarding. Musculoskeletal: Normal range of motion. General: No tenderness.       Right lower leg: Edema Efforts were made to edit the dictations but occasionally words are mis-transcribed.)    Hammonds MD, F.A.A.E.M.   Attending Emergency Medicine Physician      Kings Roca MD  02/24/21 [id number]

## 2021-02-25 LAB
EKG ATRIAL RATE: 82 BPM
EKG ATRIAL RATE: 86 BPM
EKG P AXIS: -13 DEGREES
EKG P-R INTERVAL: 132 MS
EKG P-R INTERVAL: 140 MS
EKG Q-T INTERVAL: 454 MS
EKG Q-T INTERVAL: 458 MS
EKG QRS DURATION: 162 MS
EKG QRS DURATION: 168 MS
EKG QTC CALCULATION (BAZETT): 530 MS
EKG QTC CALCULATION (BAZETT): 548 MS
EKG R AXIS: -49 DEGREES
EKG R AXIS: -52 DEGREES
EKG T AXIS: -19 DEGREES
EKG T AXIS: -48 DEGREES
EKG VENTRICULAR RATE: 82 BPM
EKG VENTRICULAR RATE: 86 BPM

## 2021-02-25 NOTE — ED PROVIDER NOTES
[facility] ED  [address] [facility]  Phone: [phone]        ADDENDUM:      Care of this patient was assumed from Dr. Elian Curiel the patient was seen for Shortness of Breath (x1 week), Cough, and Fatigue  . The patient's initial evaluation and plan have been discussed with the prior provider who initially evaluated the patient. Nursing Notes, Past Medical Hx, Past Surgical Hx, Allergies, were all reviewed. PAST MEDICAL HISTORY    has a past medical history of CRF (chronic renal failure), Diabetes mellitus ([address] Gout, Hyperlipidemia, Hypertension, and Thyroid disease. SURGICAL HISTORY      has a past surgical history that includes back surgery; Carpal tunnel release; Foot surgery; lipoma resection; and Cataract removal.    CURRENT MEDICATIONS       Previous Medications    ALLOPURINOL (ZYLOPRIM) 300 MG TABLET    Take 1 tablet by mouth daily    ASPIRIN 81 MG TABLET    Take 81 mg by mouth daily    BLOOD GLUCOSE MONITOR STRIPS    Test 3 times a day & as needed for symptoms of irregular blood glucose.     BLOOD GLUCOSE MONITORING SUPPL (ONE TOUCH ULTRA 2) W/DEVICE KIT    1 kit by Does not apply route daily    COLCHICINE (COLCRYS) 0.6 MG TABLET    Take 1 tablet by mouth 2 times daily    ESOMEPRAZOLE (NEXIUM) 20 MG DELAYED RELEASE CAPSULE    Take 1 capsule by mouth every morning (before breakfast)    FUROSEMIDE (LASIX) 80 MG TABLET    Take 80 mg by mouth daily    GLIPIZIDE (GLUCOTROL XL) 10 MG EXTENDED RELEASE TABLET    TAKE ONE TABLET BY MOUTH  EVERY MORNING AND TAKE ONE  TABLET BY MOUTH ONCE  NIGHTLY    GLUCOSE MONITORING KIT (FREESTYLE) MONITORING KIT    1 kit by Does not apply route daily    GLYCERIN-HYPROMELLOSE- (HM DRY EYE RELIEF OP)    Apply to eye Indications: 1 gtt both eyes four times a day    HANDICAP PLACARD MISC    by Does not apply route EXPIRES IN 5 YEARS FROM ORIGINAL SCRIPT DATE    INDOMETHACIN (INDOCIN) 25 MG CAPSULE    Take 1 capsule by mouth daily    LABETALOL (NORMODYNE) 100 MG TABLET    100 mg 2 times daily     LEVOTHYROXINE (SYNTHROID) 100 MCG TABLET    Take 1 tablet by mouth daily    LOPERAMIDE (IMODIUM) 2 MG CAPSULE    Take 2 mg by mouth daily    NIFEDIPINE (PROCARDIA XL) 90 MG EXTENDED RELEASE TABLET    90 mg daily     PREGABALIN (LYRICA) 150 MG CAPSULE    TAKE ONE CAPSULE BY MOUTH DAILY    SIMVASTATIN (ZOCOR) 20 MG TABLET    TAKE 1 TABLET BY MOUTH AT  NIGHT    TRAVOPROST, ROCAEL FREE, (TRAVATAN Z) 0.004 % SOLN OPHTHALMIC SOLUTION    Place 1 drop into both eyes nightly    VICTOZA 18 MG/3ML SOPN SC INJECTION    INJECT SUBCUTANEOUSLY 0.6MG DAILY       ALLERGIES     is allergic to hepatitis b virus vaccines; amlodipine besylate; and hepatitis b vaccine. Diagnostic Results     EKG: All EKG's are interpreted by the Emergency Department Physician who either signs or Co-signs this chart in the [address] a cardiologist.    Second EKG interpreted by me reveals normal sinus rhythm rate 85 with no acute ST elevations she has some T wave inversions in her inferior lateral leads right bundle branch block   this similar to her previous EKG    LABS:   Results for orders placed or performed during the hospital encounter of 02/24/21   COVID-19, Rapid    Specimen: Nasopharyngeal Swab   Result Value Ref Range    Specimen Description . NASOPHARYNGEAL SWAB     SARS-CoV-2, Rapid Not Detected Not Detected   CBC Auto Differential   Result Value Ref Range    WBC 6.2 3.5 - 11.0 k/uL    RBC 3.03 (L) 4.0 - 5.2 m/uL    Hemoglobin 8.4 (L) 12.0 - 16.0 g/dL    Hematocrit 25.6 (L) 36 - 46 %    MCV 84.5 80 - 100 fL    MCH 27.8 26 - 34 pg    MCHC 32.9 31 - 37 g/dL    RDW 16.4 (H) 12.5 - 15.4 %    Platelets 903 (L) 799 - 450 k/uL    MPV 11.4 6.0 - 12.0 fL    NRBC Automated NOT REPORTED per 100 WBC    Differential Type NOT REPORTED     Seg Neutrophils 60 36 - 66 %    Lymphocytes 31 24 - 44 %    Monocytes 9 2 - 11 %    Eosinophils % 0 (L) 1 - 4 %    Basophils 0 0 - 2 %    Immature Granulocytes NOT REPORTED 0 %    Segs Absolute 3.70 1.8 - 7.7 k/uL    Absolute Lymph # 1.90 1.0 - 4.8 k/uL    Absolute Mono # 0.60 0.1 - 1.2 k/uL    Absolute Eos # 0.00 0.0 - 0.4 k/uL    Basophils Absolute 0.00 0.0 - 0.2 k/uL    Absolute Immature Granulocyte NOT REPORTED 0.00 - 0.30 k/uL    WBC Morphology NOT REPORTED     RBC Morphology NOT REPORTED     Platelet Estimate NOT REPORTED    Comprehensive Metabolic Panel   Result Value Ref Range    Glucose 259 (H) 70 - 99 mg/dL    BUN 60 (H) 8 - 23 mg/dL    CREATININE 2.70 (H) 0.50 - 0.90 mg/dL    Bun/Cre Ratio NOT REPORTED 9 - 20    Calcium 7.6 (L) 8.6 - 10.4 mg/dL    Sodium 138 135 - 144 mmol/L    Potassium 3.8 3.7 - 5.3 mmol/L    Chloride 106 98 - 107 mmol/L    CO2 17 (L) 20 - 31 mmol/L    Anion Gap 15 9 - 17 mmol/L    Alkaline Phosphatase 112 (H) 35 - 104 U/L    ALT 15 5 - 33 U/L    AST 36 (H) <32 U/L    Total Bilirubin 0.50 0.3 - 1.2 mg/dL    Total Protein 6.4 6.4 - 8.3 g/dL    Albumin 3.4 (L) 3.5 - 5.2 g/dL    Albumin/Globulin Ratio 1.1 1.0 - 2.5    GFR Non-African American 17 (L) >60 mL/min    GFR  21 (L) >60 mL/min    GFR Comment          GFR Staging NOT REPORTED    Protime-INR   Result Value Ref Range    Protime 11.7 9.4 - 12.6 sec    INR 1.1    Troponin   Result Value Ref Range    Troponin, High Sensitivity 517 (HH) 0 - 14 ng/L    Troponin T NOT REPORTED <0.03 ng/mL    Troponin Interp NOT REPORTED    Brain Natriuretic Peptide   Result Value Ref Range    Pro-BNP 57,760 (H) <300 pg/mL    BNP Interpretation Pro-BNP Reference Range:    SPECIMEN REJECTION   Result Value Ref Range    Specimen Source . BLOOD     Ordered Test LAC     Reason for Rejection Unable to perform testing: No specimen received.      - NOT REPORTED    Lactic Acid   Result Value Ref Range    Lactic Acid 1.0 0.5 - 2.2 mmol/L   EKG 12 Lead   Result Value Ref Range    Ventricular Rate 86 BPM    Atrial Rate 86 BPM    P-R Interval 140 ms    QRS Duration 168 ms    Q-T Interval 458 ms QTc Calculation (Bazett) 548 ms    P Axis -13 degrees    R Axis -49 degrees    T Axis -19 degrees       RADIOLOGY:  XR CHEST PORTABLE   Final Result   Bilateral pulmonary opacities, concerning for multifocal pneumonia or edema. Cardiomegaly. RECENT VITALS:  BP: 122/80, Temp: 99 °F (37.2 °C), Pulse: 88, Resp: 20     ED Course     The patient was given the following medications:  Orders Placed This Encounter   Medications    furosemide (LASIX) injection 20 mg       Medical Decision Making               EMERGENCY DEPARTMENT COURSE:   Vitals:    Vitals:    02/24/21 1814   BP: 122/80   Pulse: 88   Resp: 20   Temp: 99 °F (37.2 °C)   TempSrc: Oral   SpO2: (!) 79%   Weight: 27.2 kg (60 lb)   Height: 5' 5\" (1.651 m)     -------------------------  BP: 122/80, Temp: 99 °F (37.2 °C), Pulse: 88, Resp: 20      RE-EVALUATION:  Patient states she feels comfortable chest x-ray reveals CHF proBNP is elevated at nearly 60,000 troponin is elevated at 500 he has an elevated BUN and creatinine which is higher than usual but close to her range she is anemic at 8.4 with the previous hemoglobin back in July of last year of 11.7 at this point there are clear findings of admitting her for congestive heart failure anemia and possible NSTEMI family is requesting to go to [facility] I did contact the hospitalist on-call for [facility] who is agreeable to accept    CONSULTS:      PROCEDURES:  None    FINAL IMPRESSION      1. Diastolic congestive heart failure, unspecified HF chronicity ([facility] [address]    2. NSTEMI (non-ST elevated myocardial infarction) ([facility] [address]    3. Anemia, unspecified type          DISPOSITION/PLAN   DISPOSITION Decision To Transfer 02/24/2021 08:50:33 PM      CONDITION ON DISPOSITION:   Fair    PATIENT REFERRED TO:  No follow-up provider specified.     DISCHARGE MEDICATIONS:  New Prescriptions    No medications on file       (Please note that portions of this note were completed with a voicerecognition program. Efforts were made to edit the dictations but occasionally words are mis-transcribed.)    Malone MD, F.A.C.E.P.   Attending Emergency Medicine Physician                    Shu Suero MD  02/24/21 2053

## 2021-02-25 NOTE — ED NOTES
#2 blood culture drawn and sent to lab     Tommie Curiel, [address] [place] [address]  02/24/21 2015

## 2021-02-25 NOTE — ED NOTES
Pt presents to the ED via private auto accomapanied by her daughter. Pt states she has had a minor, slightly productive cough x1 week. Pt also c/o shortness of breath x1 week and progressively getting worse. Pt did not want to come to the hospital but her daughter insisted.       Naima Reyes, RN  02/24/21 2001

## 2021-02-25 NOTE — ED NOTES
Blood cultures x1 drawn with iv start     Bernadette Connolly RN  02/24/21 2009 [FreeTextEntry1] : The patietn has been feeling well. He has his baseline GARCIA which in the past has been attributed to his asbestos exposure ETT echo ws negative for echo ischemia at moderate exercise load. . Mild increased in creatinine.

## 2021-02-25 NOTE — ED NOTES
[facility] notified of possible admission to [facility] however pt changed her mind during call and is now requesting Janusz Khan to notify [address] Fidelia Monroy RN  02/24/21 2012

## 2021-03-02 LAB
CULTURE: NORMAL
CULTURE: NORMAL
Lab: NORMAL
Lab: NORMAL
SPECIMEN DESCRIPTION: NORMAL
SPECIMEN DESCRIPTION: NORMAL

## 2021-08-11 PROBLEM — E11.42 TYPE 2 DIABETES MELLITUS WITH DIABETIC POLYNEUROPATHY, WITHOUT LONG-TERM CURRENT USE OF INSULIN (HCC): Status: ACTIVE | Noted: 2021-08-11

## 2021-08-14 ENCOUNTER — APPOINTMENT (OUTPATIENT)
Dept: GENERAL RADIOLOGY | Facility: CLINIC | Age: 76
End: 2021-08-14
Payer: MEDICARE

## 2021-08-14 ENCOUNTER — HOSPITAL ENCOUNTER (EMERGENCY)
Facility: CLINIC | Age: 76
Discharge: HOME | End: 2021-08-14
Attending: EMERGENCY MEDICINE
Payer: MEDICARE

## 2021-08-14 VITALS
TEMPERATURE: 98.5 F | DIASTOLIC BLOOD PRESSURE: 62 MMHG | HEIGHT: 65 IN | OXYGEN SATURATION: 99 % | RESPIRATION RATE: 16 BRPM | SYSTOLIC BLOOD PRESSURE: 173 MMHG | HEART RATE: 67 BPM | WEIGHT: 146 LBS | BODY MASS INDEX: 24.32 KG/M2

## 2021-08-14 DIAGNOSIS — M25.522 LEFT ELBOW PAIN: Primary | ICD-10-CM

## 2021-08-14 PROCEDURE — 99285 EMERGENCY DEPT VISIT HI MDM: CPT

## 2021-08-14 PROCEDURE — 73070 X-RAY EXAM OF ELBOW: CPT

## 2021-08-14 PROCEDURE — 6370000000 HC RX 637 (ALT 250 FOR IP): Performed by: EMERGENCY MEDICINE

## 2021-08-14 RX ORDER — TRAMADOL HYDROCHLORIDE 50 MG/1
50 TABLET ORAL EVERY 6 HOURS PRN
Qty: 12 TABLET | Refills: 0 | Status: SHIPPED | OUTPATIENT
Start: 2021-08-14 | End: 2021-08-17

## 2021-08-14 RX ORDER — TRAMADOL HYDROCHLORIDE 50 MG/1
50 TABLET ORAL ONCE
Status: COMPLETED | OUTPATIENT
Start: 2021-08-14 | End: 2021-08-14

## 2021-08-14 RX ADMIN — TRAMADOL HYDROCHLORIDE 50 MG: 50 TABLET, FILM COATED ORAL at 04:08

## 2021-08-14 ASSESSMENT — PAIN SCALES - GENERAL
PAINLEVEL_OUTOF10: 8
PAINLEVEL_OUTOF10: 10

## 2021-08-14 ASSESSMENT — PAIN DESCRIPTION - ORIENTATION: ORIENTATION: LEFT

## 2021-08-14 ASSESSMENT — PAIN DESCRIPTION - LOCATION: LOCATION: ELBOW

## 2021-08-14 ASSESSMENT — PAIN DESCRIPTION - DESCRIPTORS: DESCRIPTORS: ACHING;THROBBING

## 2021-08-14 NOTE — ED PROVIDER NOTES
eMERGENCY dEPARTMENT eNCOUnter      Pt Name: Duglas Joe  MRN: [id number]  [place] 1945  Date of evaluation: 8/14/2021      CHIEF COMPLAINT       Chief Complaint   Patient presents with    Elbow Pain         HISTORY OF PRESENT ILLNESS    Duglas Joe is a 64 y.o. female who presents left elbow pain. Patient states for the last couple of days she has been having left elbow pain she had taken some Tylenol at home but still having pain she denies any trauma says she has a history of tennis elbow in the past and feels that it may have flared up        REVIEW OF SYSTEMS       Positive for left elbow pain negative for numbness tingling weakness negative for trauma    PAST MEDICAL HISTORY    has a past medical history of CRF (chronic renal failure), Diabetes mellitus ([address] Gout, Hyperlipidemia, Hypertension, and Thyroid disease. 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 CURRENT MEDICATIONS       Discharge Medication List as of 8/14/2021  5:57 AM      CONTINUE these medications which have NOT CHANGED    Details   gabapentin (NEURONTIN) 300 MG capsule Take 300 mg by mouth 3 times daily. Historical Med      famotidine (PEPCID) 20 MG tablet Take 1 tablet by mouth 2 times daily, Disp-180 tablet, R-1Normal      rosuvastatin (CRESTOR) 10 MG tablet TAKE 1 TABLET BY MOUTH  DAILY, Disp-90 tablet, R-1Requesting 1 year supplyNormal      carvedilol (COREG) 25 MG tablet TAKE 1 TABLET BY MOUTH  TWICE DAILY WITH MEALS, Disp-180 tablet, R-1Requesting 1 year supplyNormal      hydrALAZINE (APRESOLINE) 50 MG tablet TAKE 1 TABLET BY MOUTH 3  TIMES DAILY, Disp-270 tablet, R-3Requesting 1 year supplyNormal      glipiZIDE (GLUCOTROL XL) 10 MG extended release tablet TAKE 1 TABLET BY MOUTH IN  THE MORNING AND 1 TABLET  ONCE NIGHTLY, Disp-180 tablet, R-0Requesting 1 year supplyNormal      furosemide (LASIX) 40 MG tablet Take 40 mg by mouth 2 times daily (before meals)Historical Med      loperamide (IMODIUM) 2 MG capsule Take 2 mg by mouth 4 times daily as needed for DiarrheaHistorical Med      levothyroxine (SYNTHROID) 100 MCG tablet Take 1 tablet by mouth Daily, Disp-90 tablet, R-1Normal      blood glucose monitor strips Test 3 times a day & as needed for symptoms of irregular blood glucose., Disp-100 strip, R-2, Normal      VICTOZA 18 MG/3ML SOPN SC injection INJECT SUBCUTANEOUSLY 0.6MG DAILY, Disp-9 mL, R-3Requesting 1 year supplyNormal      Handicap Placard [facility] Starting Thu 9/10/2020, Disp-1 each,R-0, PrintEXPIRES IN 5 YEARS FROM ORIGINAL SCRIPT DATE      Blood Glucose Monitoring Suppl (ONE TOUCH ULTRA 2) w/Device KIT DAILY Starting Mon 10/7/2019, Disp-1 kit, R-0, Normal      glucose monitoring kit (FREESTYLE) monitoring kit DAILY Starting 2019, Disp-1 kit, R-0, Normal      aspirin 81 MG tablet Take 81 mg by mouth dailyHistorical Med             ALLERGIES     is allergic to hepatitis b virus vaccines, amlodipine besylate, and hepatitis b vaccine. FAMILY HISTORY     She indicated that her mother is . She indicated that her father is . She indicated that the status of her brother is unknown.     family history includes Cancer in her brother; Diabetes in her mother; Heart Disease in her father; Other in her mother. SOCIAL HISTORY      reports that she has never smoked. She has never used smokeless tobacco. She reports that she does not drink alcohol and does not use drugs. PHYSICAL EXAM     INITIAL VITALS:  height is 5' 5\" (1.651 m) and weight is 66.2 kg (146 lb). Her oral temperature is 98.5 °F (36.9 °C). Her blood pressure is 173/62 (abnormal) and her pulse is 67. Her respiration is 16 and oxygen saturation is 99%.       General: Patient alert nontoxic-appearing elderly female in no apparent distress  HEENT: Head is atraumatic  Respiratory: Patient has nonlabored respirations  Extremity: Examination left upper extremity reveals no gross deformity erythema or warmth she has full range of motion but some pain with range of motion of the elbow neurovascular and tendon function tested distally found to be intact    DIFFERENTIAL DIAGNOSIS/ MDM:     Obtain x-ray    DIAGNOSTIC RESULTS     EKG: All EKG's are interpreted by the Emergency Department Physician who either signs or Co-signs this chart in the absence of a cardiologist.        RADIOLOGY:   I directly visualized the following  images and reviewed the radiologist interpretations:  XR ELBOW LEFT (2 VIEWS)   Final Result   Unremarkable radiographic views of the left elbow. LABS:  Labs Reviewed - No data to display      EMERGENCY DEPARTMENT COURSE:   Vitals:    Vitals:    08/14/21 0354 08/14/21 0410   BP: (!) 186/59 (!) 173/62   Pulse: 69 67   Resp: 16 16   Temp: 98.5 °F (36.9 °C)    TempSrc: Oral    SpO2: 97% 99%   Weight: 66.2 kg (146 lb)    Height: 5' 5\" (1.651 m)      -------------------------  BP: (!) 173/62, Temp: 98.5 °F (36.9 °C), Pulse: 67, Resp: 16    Orders Placed This Encounter   Medications    traMADol (ULTRAM) tablet 50 mg    traMADol (ULTRAM) 50 MG tablet     Sig: Take 1 tablet by mouth every 6 hours as needed for Pain for up to 3 days. Intended supply: 3 days. Take lowest dose possible to manage pain     Dispense:  12 tablet     Refill:  0           Re-evaluation Notes    X-ray per radiologist shows nothing acute patient has appointment with her orthopedist in 3 weeks I will give her a short course for next few days of tramadol to help her with her pain no indication of infectious process she will be discharged with early follow-up and return if worse    CRITICAL CARE:   None      CONSULTS:      PROCEDURES:  None    FINAL IMPRESSION      1.  Left elbow pain          DISPOSITION/PLAN   DISPOSITION Decision To Discharge 08/14/2021 05:56:12 AM      Condition on

## 2021-09-08 ENCOUNTER — OFFICE VISIT (OUTPATIENT)
Dept: OBGYN CLINIC | Age: 76
End: 2021-09-08
Payer: MEDICARE

## 2021-09-08 VITALS
HEIGHT: 65 IN | BODY MASS INDEX: 24.66 KG/M2 | DIASTOLIC BLOOD PRESSURE: 56 MMHG | WEIGHT: 148 LBS | SYSTOLIC BLOOD PRESSURE: 110 MMHG

## 2021-09-08 DIAGNOSIS — Z12.31 ENCOUNTER FOR SCREENING MAMMOGRAM FOR MALIGNANT NEOPLASM OF BREAST: ICD-10-CM

## 2021-09-08 DIAGNOSIS — Z76.89 ENCOUNTER TO ESTABLISH CARE WITH NEW DOCTOR: ICD-10-CM

## 2021-09-08 DIAGNOSIS — Z46.89 ENCOUNTER FOR PESSARY MAINTENANCE: Primary | ICD-10-CM

## 2021-09-08 PROBLEM — N18.30 ANEMIA DUE TO STAGE 3 CHRONIC KIDNEY DISEASE (HCC): Status: ACTIVE | Noted: 2021-04-14

## 2021-09-08 PROBLEM — I25.10 CORONARY ATHEROSCLEROSIS: Status: ACTIVE | Noted: 2021-04-13

## 2021-09-08 PROBLEM — I25.5 ISCHEMIC CARDIOMYOPATHY: Status: ACTIVE | Noted: 2021-04-13

## 2021-09-08 PROBLEM — D63.1 ANEMIA DUE TO STAGE 3 CHRONIC KIDNEY DISEASE (HCC): Status: ACTIVE | Noted: 2021-04-14

## 2021-09-08 PROBLEM — I21.4 NSTEMI (NON-ST ELEVATED MYOCARDIAL INFARCTION) (HCC): Status: ACTIVE | Noted: 2021-02-24

## 2021-09-08 PROCEDURE — G8400 PT W/DXA NO RESULTS DOC: HCPCS | Performed by: NURSE PRACTITIONER

## 2021-09-08 PROCEDURE — 99203 OFFICE O/P NEW LOW 30 MIN: CPT | Performed by: NURSE PRACTITIONER

## 2021-09-08 PROCEDURE — 1036F TOBACCO NON-USER: CPT | Performed by: NURSE PRACTITIONER

## 2021-09-08 PROCEDURE — 1090F PRES/ABSN URINE INCON ASSESS: CPT | Performed by: NURSE PRACTITIONER

## 2021-09-08 PROCEDURE — 4040F PNEUMOC VAC/ADMIN/RCVD: CPT | Performed by: NURSE PRACTITIONER

## 2021-09-08 PROCEDURE — G8420 CALC BMI NORM PARAMETERS: HCPCS | Performed by: NURSE PRACTITIONER

## 2021-09-08 PROCEDURE — 1123F ACP DISCUSS/DSCN MKR DOCD: CPT | Performed by: NURSE PRACTITIONER

## 2021-09-08 PROCEDURE — G8427 DOCREV CUR MEDS BY ELIG CLIN: HCPCS | Performed by: NURSE PRACTITIONER

## 2021-09-08 ASSESSMENT — ENCOUNTER SYMPTOMS
DIARRHEA: 0
SHORTNESS OF BREATH: 0
BACK PAIN: 0
COUGH: 0
ABDOMINAL DISTENTION: 0
CONSTIPATION: 0
ABDOMINAL PAIN: 0

## 2021-11-07 ASSESSMENT — ENCOUNTER SYMPTOMS
ABDOMINAL PAIN: 0
COUGH: 0
DIARRHEA: 0
CONSTIPATION: 0
ABDOMINAL DISTENTION: 0
SHORTNESS OF BREATH: 0
BACK PAIN: 0

## 2021-11-07 NOTE — PROGRESS NOTES
Subjective:      Patient ID: Reji Saxena is being seen today for No chief complaint on file. HPI  Here for pessary maintenance. Has removed and cleaned every other month. Feels it is still helping with incontinence issues, but not prolapse. Sometimes feels that it is bulging. Also has urge incontinence. 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 Encouraged \"scheduled voiding to try to avoid the strong urge. No other concerns today. Review of Systems   Constitutional: Negative for appetite change and fatigue. HENT: Negative for congestion and hearing loss. Eyes: Negative for visual disturbance. Respiratory: Negative for cough and shortness of breath. Cardiovascular: Negative for chest pain and palpitations. Gastrointestinal: Negative for abdominal distention, abdominal pain, constipation and diarrhea. Genitourinary: Positive for difficulty urinating (urge incontinence). Negative for flank pain, frequency, menstrual problem, pelvic pain and vaginal discharge. Musculoskeletal: Negative for back pain. Neurological: Negative for syncope and headaches. Psychiatric/Behavioral: Negative for behavioral problems. There were no vitals filed for this visit.   Current Outpatient Medications   Medication Sig Dispense Refill    levothyroxine (SYNTHROID) 100 MCG tablet TAKE 1 TABLET BY MOUTH  DAILY 90 tablet 3    glipiZIDE (GLUCOTROL XL) 10 MG extended release tablet TAKE 1 TABLET BY MOUTH IN  THE MORNING AND 1 TABLET  ONCE NIGHTLY 180 tablet 3    famotidine (PEPCID) 20 MG tablet Take 1 tablet by mouth 2 times daily 180 tablet 1    rosuvastatin (CRESTOR) 10 MG tablet TAKE 1 TABLET BY MOUTH  DAILY 90 tablet 1    carvedilol (COREG) 25 MG tablet TAKE 1 TABLET BY MOUTH  TWICE DAILY WITH MEALS 180 tablet 1    hydrALAZINE (APRESOLINE) 50 MG tablet TAKE 1 TABLET BY MOUTH 3  TIMES DAILY 270 tablet 3    furosemide (LASIX) 40 MG tablet Take 40 mg by mouth 2 Thought content normal.         Judgment: Judgment normal.     Pessary removed, cleaned with  soap and water, replaced without difficulty. Assessment:      1. Encounter for pessary maintenance          Plan:      Patient is a 64 y.o. No obstetric history on file. seen with total face to face time of 15 minutes. More than 50% of this visit was on counseling and education regarding her    Diagnosis Orders   1. Encounter for pessary maintenance      and her options. She was also counseled on her preventative health maintenance recommendations . Return 2-3 months for pessary maintenance. . Refer to plan and assessment.     Recent Results (from the past 8736 hour(s))   POCT glycosylated hemoglobin (Hb A1C)    Collection Time: 11/09/20  3:03 PM   Result Value Ref Range    Hemoglobin A1C 10.6 %   POCT glycosylated hemoglobin (Hb A1C)    Collection Time: 02/10/21  8:44 AM   Result Value Ref Range    Hemoglobin A1C 9.4 %   EKG 12 Lead    Collection Time: 02/24/21  6:20 PM   Result Value Ref Range    Ventricular Rate 82 BPM    Atrial Rate 82 BPM    P-R Interval 132 ms    QRS Duration 162 ms    Q-T Interval 454 ms    QTc Calculation (Bazett) 530 ms    R Axis -52 degrees    T Axis -48 degrees   CBC Auto Differential    Collection Time: 02/24/21  6:38 PM   Result Value Ref Range    WBC 6.2 3.5 - 11.0 k/uL    RBC 3.03 (L) 4.0 - 5.2 m/uL    Hemoglobin 8.4 (L) 12.0 - 16.0 g/dL    Hematocrit 25.6 (L) 36 - 46 %    MCV 84.5 80 - 100 fL    MCH 27.8 26 - 34 pg    MCHC 32.9 31 - 37 g/dL    RDW 16.4 (H) 12.5 - 15.4 %    Platelets 269 (L) 047 - 450 k/uL    MPV 11.4 6.0 - 12.0 fL    NRBC Automated NOT REPORTED per 100 WBC    Differential Type NOT REPORTED     Seg Neutrophils 60 36 - 66 %    Lymphocytes 31 24 - 44 %    Monocytes 9 2 - 11 %    Eosinophils % 0 (L) 1 - 4 %    Basophils 0 0 - 2 %    Immature Granulocytes NOT REPORTED 0 %    Segs Absolute 3.70 1.8 - 7.7 k/uL    Absolute Lymph # 1.90 1.0 - 4.8 k/uL    Absolute Mono # 0.60 0.1 - 1.2 k/uL    Absolute Eos # 0.00 0.0 - 0.4 k/uL    Basophils Absolute 0.00 0.0 - 0.2 k/uL    Absolute Immature Granulocyte NOT REPORTED 0.00 - 0.30 k/uL    WBC Morphology NOT REPORTED     RBC Morphology NOT REPORTED     Platelet Estimate NOT REPORTED    Comprehensive Metabolic Panel    Collection Time: 02/24/21  6:38 PM   Result Value Ref Range    Glucose 259 (H) 70 - 99 mg/dL    BUN 60 (H) 8 - 23 mg/dL    CREATININE 2.70 (H) 0.50 - 0.90 mg/dL    Bun/Cre Ratio NOT REPORTED 9 - 20    Calcium 7.6 (L) 8.6 - 10.4 mg/dL    Sodium 138 135 - 144 mmol/L    Potassium 3.8 3.7 - 5.3 mmol/L    Chloride 106 98 - 107 mmol/L    CO2 17 (L) 20 - 31 mmol/L    Anion Gap 15 9 - 17 mmol/L    Alkaline Phosphatase 112 (H) 35 - 104 U/L    ALT 15 5 - 33 U/L    AST 36 (H) <32 U/L    Total Bilirubin 0.50 0.3 - 1.2 mg/dL    Total Protein 6.4 6.4 - 8.3 g/dL    Albumin 3.4 (L) 3.5 - 5.2 g/dL    Albumin/Globulin Ratio 1.1 1.0 - 2.5    GFR Non-African American 17 (L) >60 mL/min    GFR  21 (L) >60 mL/min    GFR Comment          GFR Staging NOT REPORTED    Protime-INR    Collection Time: 02/24/21  6:38 PM   Result Value Ref Range    Protime 11.7 9.4 - 12.6 sec    INR 1.1    Troponin    Collection Time: 02/24/21  6:38 PM   Result Value Ref Range    Troponin, High Sensitivity 517 (HH) 0 - 14 ng/L    Troponin T NOT REPORTED <0.03 ng/mL    Troponin Interp NOT REPORTED    Brain Natriuretic Peptide    Collection Time: 02/24/21  6:38 PM   Result Value Ref Range    Pro-BNP 57,760 (H) <300 pg/mL    BNP Interpretation Pro-BNP Reference Range:    Culture, Blood 1    Collection Time: 02/24/21  6:38 PM    Specimen: Blood   Result Value Ref Range    Specimen Description . BLOOD     Special Requests RIGHT ANTECUBE 12CC     Culture NO GROWTH 6 DAYS    SPECIMEN REJECTION    Collection Time: 02/24/21  6:38 PM   Result Value Ref Range    Specimen Source . BLOOD     Ordered Test LAC     Reason for Rejection Unable to perform testing: No specimen 2. 8     VLDL 33 mg/dL    Cholesterol non HDL     POCT glycosylated hemoglobin (Hb A1C)    Collection Time: 08/11/21  9:39 AM   Result Value Ref Range    Hemoglobin A1C 7.4 %         PLAN:   Return for pessary maintenance and prn  Mammogram ordered ay previous visit

## 2021-11-08 ENCOUNTER — OFFICE VISIT (OUTPATIENT)
Dept: OBGYN CLINIC | Age: 76
End: 2021-11-08
Payer: MEDICARE

## 2021-11-08 VITALS
BODY MASS INDEX: 24.93 KG/M2 | SYSTOLIC BLOOD PRESSURE: 104 MMHG | HEIGHT: 65 IN | WEIGHT: 149.6 LBS | DIASTOLIC BLOOD PRESSURE: 70 MMHG

## 2021-11-08 DIAGNOSIS — Z46.89 ENCOUNTER FOR PESSARY MAINTENANCE: Primary | ICD-10-CM

## 2021-11-08 PROCEDURE — 1036F TOBACCO NON-USER: CPT | Performed by: NURSE PRACTITIONER

## 2021-11-08 PROCEDURE — 4040F PNEUMOC VAC/ADMIN/RCVD: CPT | Performed by: NURSE PRACTITIONER

## 2021-11-08 PROCEDURE — G8484 FLU IMMUNIZE NO ADMIN: HCPCS | Performed by: NURSE PRACTITIONER

## 2021-11-08 PROCEDURE — 1090F PRES/ABSN URINE INCON ASSESS: CPT | Performed by: NURSE PRACTITIONER

## 2021-11-08 PROCEDURE — G8420 CALC BMI NORM PARAMETERS: HCPCS | Performed by: NURSE PRACTITIONER

## 2021-11-08 PROCEDURE — 99213 OFFICE O/P EST LOW 20 MIN: CPT | Performed by: NURSE PRACTITIONER

## 2021-11-08 PROCEDURE — G8400 PT W/DXA NO RESULTS DOC: HCPCS | Performed by: NURSE PRACTITIONER

## 2021-11-08 PROCEDURE — 1123F ACP DISCUSS/DSCN MKR DOCD: CPT | Performed by: NURSE PRACTITIONER

## 2021-11-08 PROCEDURE — G8427 DOCREV CUR MEDS BY ELIG CLIN: HCPCS | Performed by: NURSE PRACTITIONER

## 2021-11-11 PROBLEM — N18.32 CHRONIC RENAL FAILURE, STAGE 3B (HCC): Status: ACTIVE | Noted: 2021-11-11

## 2021-11-19 ENCOUNTER — HOSPITAL ENCOUNTER (OUTPATIENT)
Dept: MAMMOGRAPHY | Facility: CLINIC | Age: 76
Discharge: HOME | End: 2021-11-21
Payer: MEDICARE

## 2021-11-19 DIAGNOSIS — Z12.31 ENCOUNTER FOR SCREENING MAMMOGRAM FOR MALIGNANT NEOPLASM OF BREAST: ICD-10-CM

## 2021-11-19 PROCEDURE — 77067 SCR MAMMO BI INCL CAD: CPT

## 2022-01-11 ASSESSMENT — ENCOUNTER SYMPTOMS
CONSTIPATION: 0
ABDOMINAL PAIN: 0
COUGH: 0
DIARRHEA: 0
ABDOMINAL DISTENTION: 0
BACK PAIN: 0
SHORTNESS OF BREATH: 0

## 2022-01-12 ENCOUNTER — OFFICE VISIT (OUTPATIENT)
Dept: OBGYN CLINIC | Age: 77
End: 2022-01-12
Payer: MEDICARE

## 2022-01-12 VITALS
WEIGHT: 163 LBS | DIASTOLIC BLOOD PRESSURE: 68 MMHG | SYSTOLIC BLOOD PRESSURE: 112 MMHG | BODY MASS INDEX: 27.16 KG/M2 | HEIGHT: 65 IN

## 2022-01-12 DIAGNOSIS — Z46.89 ENCOUNTER FOR PESSARY MAINTENANCE: Primary | ICD-10-CM

## 2022-01-12 DIAGNOSIS — N95.0 PMB (POSTMENOPAUSAL BLEEDING): ICD-10-CM

## 2022-01-12 PROCEDURE — 4040F PNEUMOC VAC/ADMIN/RCVD: CPT | Performed by: NURSE PRACTITIONER

## 2022-01-12 PROCEDURE — 1090F PRES/ABSN URINE INCON ASSESS: CPT | Performed by: NURSE PRACTITIONER

## 2022-01-12 PROCEDURE — G8427 DOCREV CUR MEDS BY ELIG CLIN: HCPCS | Performed by: NURSE PRACTITIONER

## 2022-01-12 PROCEDURE — G8400 PT W/DXA NO RESULTS DOC: HCPCS | Performed by: NURSE PRACTITIONER

## 2022-01-12 PROCEDURE — G8417 CALC BMI ABV UP PARAM F/U: HCPCS | Performed by: NURSE PRACTITIONER

## 2022-01-12 PROCEDURE — 1036F TOBACCO NON-USER: CPT | Performed by: NURSE PRACTITIONER

## 2022-01-12 PROCEDURE — 1123F ACP DISCUSS/DSCN MKR DOCD: CPT | Performed by: NURSE PRACTITIONER

## 2022-01-12 PROCEDURE — G8484 FLU IMMUNIZE NO ADMIN: HCPCS | Performed by: NURSE PRACTITIONER

## 2022-01-12 PROCEDURE — 99213 OFFICE O/P EST LOW 20 MIN: CPT | Performed by: NURSE PRACTITIONER

## 2022-01-12 NOTE — PROGRESS NOTES
Subjective:      Patient ID: Kathi Lopez is being seen today for No chief complaint on file. HPI  Here for pessary maintenance. Has removed and cleaned every other month. Feels it is still helping with incontinence somewhat. She states that she has noticed some spotting on the toilet paper when she wipes twice since her last maintenance appt 2 months ago. She denies prior VB. Has had the pessary for many years. Lost a significant amount of weight after her MI and open-heart surgery. Recommended pelvic US to look at endometrial lining-PMB vs bleeding from cervix or vag wall due to pessary irritation. .Review of Systems   Constitutional: Negative for appetite change and fatigue. HENT: Negative for congestion and hearing loss. Eyes: Negative for visual disturbance. Respiratory: Negative for cough and shortness of breath. Cardiovascular: Negative for chest pain and palpitations. Gastrointestinal: Negative for abdominal distention, abdominal pain, constipation and diarrhea. Genitourinary: Positive for menstrual problem (VB). Negative for flank pain, frequency, pelvic pain and vaginal discharge. Musculoskeletal: Negative for back pain. Neurological: Negative for syncope and headaches. Psychiatric/Behavioral: Negative for behavioral problems. There were no vitals filed for this visit.   Current Outpatient Medications   Medication Sig Dispense Refill    carvedilol (COREG) 25 MG tablet TAKE 1 TABLET BY MOUTH  TWICE DAILY WITH MEALS 180 tablet 3    rosuvastatin (CRESTOR) 10 MG tablet TAKE 1 TABLET BY MOUTH  DAILY 90 tablet 3    levothyroxine (SYNTHROID) 100 MCG tablet TAKE 1 TABLET BY MOUTH  DAILY 90 tablet 3    glipiZIDE (GLUCOTROL XL) 10 MG extended release tablet TAKE 1 TABLET BY MOUTH IN  THE MORNING AND 1 TABLET  ONCE NIGHTLY 180 tablet 3    famotidine (PEPCID) 20 MG tablet Take 1 tablet by mouth 2 times daily 180 tablet 1    hydrALAZINE (APRESOLINE) 50 MG tablet TAKE 1 TABLET BY MOUTH 3  TIMES DAILY 270 tablet 3    furosemide (LASIX) 40 MG tablet Take 40 mg by mouth 2 times daily (before meals)      loperamide (IMODIUM) 2 MG capsule Take 2 mg by mouth 4 times daily as needed for Diarrhea       blood glucose monitor strips Test 3 times a day & as needed for symptoms of irregular blood glucose. 100 strip 2    VICTOZA 18 MG/3ML SOPN SC injection INJECT SUBCUTANEOUSLY 0.6MG DAILY 9 mL 3    Handicap Placard MISC by Does not apply route EXPIRES IN 5 YEARS FROM ORIGINAL SCRIPT DATE 1 each 0    Blood Glucose Monitoring Suppl (ONE TOUCH ULTRA 2) w/Device KIT 1 kit by Does not apply route daily 1 kit 0    glucose monitoring kit (FREESTYLE) monitoring kit 1 kit by Does not apply route daily 1 kit 0    aspirin 81 MG tablet Take 81 mg by mouth daily       No current facility-administered medications for this visit. Allergies   Allergen Reactions    Hepatitis B Virus Vaccines Other (See Comments)     Skin ulcer    Amlodipine Besylate Rash    Hepatitis B Vaccine Other (See Comments)     Skin ulcer     Patient Active Problem List   Diagnosis    Urinary incontinence    Type 2 diabetes mellitus with stage 3 chronic kidney disease, without long-term current use of insulin (HCC)    Essential hypertension    Acquired hypothyroidism    Mixed hyperlipidemia    Gastroesophageal reflux disease without esophagitis    Type 2 diabetes mellitus with diabetic polyneuropathy, without long-term current use of insulin (HCC)    Anemia due to stage 3 chronic kidney disease (HCC)    Coronary atherosclerosis    Ischemic cardiomyopathy    NSTEMI (non-ST elevated myocardial infarction) ([facility])    Chronic renal failure, stage 3b ([facility])        Objective:   Physical Exam  Constitutional:       Appearance: Normal appearance. She is normal weight. Genitourinary:      Right Labia: No rash, tenderness or lesions. Left Labia: No tenderness, lesions or rash.      Vaginal erythema (mild at cul-de-sac) present. No vaginal discharge, tenderness or ulceration. HENT:      Head: Normocephalic. Eyes:      Extraocular Movements: Extraocular movements intact. Conjunctiva/sclera: Conjunctivae normal.   Pulmonary:      Effort: Pulmonary effort is normal.   Abdominal:      General: Abdomen is flat. Palpations: Abdomen is soft. Musculoskeletal:         General: Normal range of motion. Cervical back: Normal range of motion. Neurological:      General: No focal deficit present. Mental Status: She is alert and oriented to person, place, and time. Mental status is at baseline. Skin:     General: Skin is warm and dry. Psychiatric:         Mood and Affect: Mood normal.         Behavior: Behavior normal.         Thought Content: Thought content normal.         Judgment: Judgment normal.     Pessary removed with mild difficulty- very tight. Pt tolerated well. Discussed leaving it out to allow any irritation to heal.  Will schedule pelvic US and I am recommending that she FU with Dr. Elvira Oh to evaluate and be re-fitted with a new pessary. Pt agreeable to plan of care. Will monitor for any other VB. Assessment:      1. Encounter for pessary maintenance          Plan:      Patient is a 64 y.o. [id number]  seen with total face to face time of 15 minutes. More than 50% of this visit was on counseling and education regarding her    Diagnosis Orders   1. Encounter for pessary maintenance      and her options. She was also counseled on her preventative health maintenance recommendations and follow-up of pelvic US. Refer to plan and assessment.     Recent Results (from the past 8736 hour(s))   POCT glycosylated hemoglobin (Hb A1C)    Collection Time: 02/10/21  8:44 AM   Result Value Ref Range    Hemoglobin A1C 9.4 %   EKG 12 Lead    Collection Time: 02/24/21  6:20 PM   Result Value Ref Range    Ventricular Rate 82 BPM    Atrial Rate 82 BPM    P-R Interval 132 ms    QRS Duration 162 ms    Q-T Interval 454 ms    QTc Calculation (Bazett) 530 ms    R Axis -52 degrees    T Axis -48 degrees   CBC Auto Differential    Collection Time: 02/24/21  6:38 PM   Result Value Ref Range    WBC 6.2 3.5 - 11.0 k/uL    RBC 3.03 (L) 4.0 - 5.2 m/uL    Hemoglobin 8.4 (L) 12.0 - 16.0 g/dL    Hematocrit 25.6 (L) 36 - 46 %    MCV 84.5 80 - 100 fL    MCH 27.8 26 - 34 pg    MCHC 32.9 31 - 37 g/dL    RDW 16.4 (H) 12.5 - 15.4 %    Platelets 330 (L) 496 - 450 k/uL    MPV 11.4 6.0 - 12.0 fL    NRBC Automated NOT REPORTED per 100 WBC    Differential Type NOT REPORTED     Seg Neutrophils 60 36 - 66 %    Lymphocytes 31 24 - 44 %    Monocytes 9 2 - 11 %    Eosinophils % 0 (L) 1 - 4 %    Basophils 0 0 - 2 %    Immature Granulocytes NOT REPORTED 0 %    Segs Absolute 3.70 1.8 - 7.7 k/uL    Absolute Lymph # 1.90 1.0 - 4.8 k/uL    Absolute Mono # 0.60 0.1 - 1.2 k/uL    Absolute Eos # 0.00 0.0 - 0.4 k/uL    Basophils Absolute 0.00 0.0 - 0.2 k/uL    Absolute Immature Granulocyte NOT REPORTED 0.00 - 0.30 k/uL    WBC Morphology NOT REPORTED     RBC Morphology NOT REPORTED     Platelet Estimate NOT REPORTED    Comprehensive Metabolic Panel    Collection Time: 02/24/21  6:38 PM   Result Value Ref Range    Glucose 259 (H) 70 - 99 mg/dL    BUN 60 (H) 8 - 23 mg/dL    CREATININE 2.70 (H) 0.50 - 0.90 mg/dL    Bun/Cre Ratio NOT REPORTED 9 - 20    Calcium 7.6 (L) 8.6 - 10.4 mg/dL    Sodium 138 135 - 144 mmol/L    Potassium 3.8 3.7 - 5.3 mmol/L    Chloride 106 98 - 107 mmol/L    CO2 17 (L) 20 - 31 mmol/L    Anion Gap 15 9 - 17 mmol/L    Alkaline Phosphatase 112 (H) 35 - 104 U/L    ALT 15 5 - 33 U/L    AST 36 (H) <32 U/L    Total Bilirubin 0.50 0.3 - 1.2 mg/dL    Total Protein 6.4 6.4 - 8.3 g/dL    Albumin 3.4 (L) 3.5 - 5.2 g/dL    Albumin/Globulin Ratio 1.1 1.0 - 2.5    GFR Non-African American 17 (L) >60 mL/min    GFR  21 (L) >60 mL/min    GFR Comment          GFR Staging NOT REPORTED    Protime-INR    Collection Time: 02/24/21 6:38 PM   Result Value Ref Range    Protime 11.7 9.4 - 12.6 sec    INR 1.1    Troponin    Collection Time: 02/24/21  6:38 PM   Result Value Ref Range    Troponin, High Sensitivity 517 (HH) 0 - 14 ng/L    Troponin T NOT REPORTED <0.03 ng/mL    Troponin Interp NOT REPORTED    Brain Natriuretic Peptide    Collection Time: 02/24/21  6:38 PM   Result Value Ref Range    Pro-BNP 57,760 (H) <300 pg/mL    BNP Interpretation Pro-BNP Reference Range:    Culture, Blood 1    Collection Time: 02/24/21  6:38 PM    Specimen: Blood   Result Value Ref Range    Specimen Description . BLOOD     Special Requests RIGHT ANTECUBE 12CC     Culture NO GROWTH 6 DAYS    SPECIMEN REJECTION    Collection Time: 02/24/21  6:38 PM   Result Value Ref Range    Specimen Source . BLOOD     Ordered Test LAC     Reason for Rejection Unable to perform testing: No specimen received. - NOT REPORTED    Culture, Blood 1    Collection Time: 02/24/21  7:12 PM    Specimen: Blood   Result Value Ref Range    Specimen Description . BLOOD     Special Requests LEFT FOREARM 12CC     Culture NO GROWTH 6 DAYS    Lactic Acid    Collection Time: 02/24/21  7:12 PM   Result Value Ref Range    Lactic Acid 1.0 0.5 - 2.2 mmol/L   COVID-19, Rapid    Collection Time: 02/24/21  7:20 PM    Specimen: Nasopharyngeal Swab   Result Value Ref Range    Specimen Description . NASOPHARYNGEAL SWAB     SARS-CoV-2, Rapid Not Detected Not Detected   EKG 12 Lead    Collection Time: 02/24/21  8:38 PM   Result Value Ref Range    Ventricular Rate 86 BPM    Atrial Rate 86 BPM    P-R Interval 140 ms    QRS Duration 168 ms    Q-T Interval 458 ms    QTc Calculation (Bazett) 548 ms    P Axis -13 degrees    R Axis -49 degrees    T Axis -19 degrees   Troponin    Collection Time: 02/24/21  8:40 PM   Result Value Ref Range    Troponin, High Sensitivity 546 (HH) 0 - 14 ng/L    Troponin T NOT REPORTED <0.03 ng/mL    Troponin Interp NOT REPORTED    Hemoglobin A1C    Collection Time: 04/06/21 12:00 AM Result Value Ref Range    Hemoglobin A1C 7.7 %    AVERAGE GLUCOSE     Hemoglobin A1C    Collection Time: 04/06/21 12:00 AM   Result Value Ref Range    Hemoglobin A1C 7.7 %    AVERAGE GLUCOSE     Comprehensive Metabolic Panel, Fasting    Collection Time: 08/11/21 12:00 AM   Result Value Ref Range    Glucose, Fasting 86 mg/dL    CREATININE 2.07 (H)     BUN 70 (H) mg/dL    Sodium 143 mmol/L    Potassium 3.9 mmol/L    Chloride 102 mmol/L    CO2 27 mmol/L    Calcium 9.9 mg/dL    AST 23 U/L    Alkaline Phosphatase 72 U/L    Total Protein 7.2 6.4 - 8.2 g/dL    Albumin 4.1     Total Bilirubin 0.5 0.1 - 1.4 mg/dL    ALT 17 U/L   Lipid Panel    Collection Time: 08/11/21 12:00 AM   Result Value Ref Range    Cholesterol, Total 118 (L) mg/dL    HDL 42 35 - 70 mg/dL    LDL Calculated 43 0 - 160 mg/dL    Triglycerides 167 (H) mg/dL    Chol/HDL Ratio 2.8     VLDL 33 mg/dL    Cholesterol non HDL     POCT glycosylated hemoglobin (Hb A1C)    Collection Time: 08/11/21  9:39 AM   Result Value Ref Range    Hemoglobin A1C 7.4 %   POCT glycosylated hemoglobin (Hb A1C)    Collection Time: 11/11/21 10:54 AM   Result Value Ref Range    Hemoglobin A1C 8.1 %           PLAN:   Pelvic US  Monitor VB  FU appt w/Dr. Gamez Foil 3-4 weeks

## 2022-01-27 ENCOUNTER — APPOINTMENT (OUTPATIENT)
Dept: GENERAL RADIOLOGY | Facility: CLINIC | Age: 77
End: 2022-01-27
Payer: COMMERCIAL

## 2022-01-27 ENCOUNTER — HOSPITAL ENCOUNTER (EMERGENCY)
Facility: CLINIC | Age: 77
Discharge: HOME | End: 2022-01-27
Attending: EMERGENCY MEDICINE
Payer: COMMERCIAL

## 2022-01-27 VITALS
WEIGHT: 158 LBS | RESPIRATION RATE: 16 BRPM | DIASTOLIC BLOOD PRESSURE: 52 MMHG | BODY MASS INDEX: 26.33 KG/M2 | SYSTOLIC BLOOD PRESSURE: 159 MMHG | HEART RATE: 70 BPM | OXYGEN SATURATION: 97 % | HEIGHT: 65 IN | TEMPERATURE: 98.3 F

## 2022-01-27 DIAGNOSIS — S93.401A MODERATE RIGHT ANKLE SPRAIN, INITIAL ENCOUNTER: Primary | ICD-10-CM

## 2022-01-27 PROCEDURE — 73610 X-RAY EXAM OF ANKLE: CPT

## 2022-01-27 PROCEDURE — 6370000000 HC RX 637 (ALT 250 FOR IP): Performed by: EMERGENCY MEDICINE

## 2022-01-27 PROCEDURE — 99284 EMERGENCY DEPT VISIT MOD MDM: CPT

## 2022-01-27 RX ORDER — ACETAMINOPHEN 325 MG/1
650 TABLET ORAL ONCE
Status: COMPLETED | OUTPATIENT
Start: 2022-01-27 | End: 2022-01-27

## 2022-01-27 RX ADMIN — ACETAMINOPHEN 650 MG: 325 TABLET ORAL at 07:14

## 2022-01-27 ASSESSMENT — ENCOUNTER SYMPTOMS
EYE PAIN: 0
RHINORRHEA: 0
SHORTNESS OF BREATH: 0
VOMITING: 0
ABDOMINAL PAIN: 0
DIARRHEA: 0
BACK PAIN: 0
NAUSEA: 0
SORE THROAT: 0
COUGH: 0

## 2022-01-27 ASSESSMENT — PAIN SCALES - GENERAL
PAINLEVEL_OUTOF10: 5
PAINLEVEL_OUTOF10: 4
PAINLEVEL_OUTOF10: 5

## 2022-01-27 ASSESSMENT — PAIN DESCRIPTION - LOCATION
LOCATION: ANKLE;LEG
LOCATION: ANKLE

## 2022-01-27 ASSESSMENT — PAIN DESCRIPTION - DESCRIPTORS
DESCRIPTORS: THROBBING
DESCRIPTORS: ACHING

## 2022-01-27 ASSESSMENT — PAIN DESCRIPTION - FREQUENCY: FREQUENCY: CONTINUOUS

## 2022-01-27 ASSESSMENT — PAIN DESCRIPTION - ORIENTATION: ORIENTATION: RIGHT
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
## 2022-02-09 ENCOUNTER — HOSPITAL ENCOUNTER (OUTPATIENT)
Age: 77
Setting detail: SPECIMEN
Discharge: HOME | End: 2022-02-09

## 2022-02-09 DIAGNOSIS — E03.9 ACQUIRED HYPOTHYROIDISM: ICD-10-CM

## 2022-02-09 DIAGNOSIS — E11.42 TYPE 2 DIABETES MELLITUS WITH DIABETIC POLYNEUROPATHY, WITHOUT LONG-TERM CURRENT USE OF INSULIN (HCC): ICD-10-CM

## 2022-02-09 PROBLEM — I10 ESSENTIAL HYPERTENSION: Status: RESOLVED | Noted: 2018-05-10 | Resolved: 2022-02-09

## 2022-02-09 PROBLEM — I12.9 RENAL HYPERTENSION: Status: ACTIVE | Noted: 2022-02-09

## 2022-02-09 LAB
ALBUMIN SERPL-MCNC: 3.6 G/DL (ref 3.5–5.2)
ALBUMIN/GLOBULIN RATIO: 1.3 (ref 1–2.5)
ALP BLD-CCNC: 86 U/L (ref 35–104)
ALT SERPL-CCNC: 9 U/L (ref 5–33)
ANION GAP SERPL CALCULATED.3IONS-SCNC: 14 MMOL/L (ref 9–17)
AST SERPL-CCNC: 10 U/L
BILIRUB SERPL-MCNC: 0.27 MG/DL (ref 0.3–1.2)
BUN BLDV-MCNC: 58 MG/DL (ref 8–23)
BUN/CREAT BLD: ABNORMAL (ref 9–20)
CALCIUM SERPL-MCNC: 8.6 MG/DL (ref 8.6–10.4)
CHLORIDE BLD-SCNC: 101 MMOL/L (ref 98–107)
CO2: 23 MMOL/L (ref 20–31)
CREAT SERPL-MCNC: 2.5 MG/DL (ref 0.5–0.9)
ESTIMATED AVERAGE GLUCOSE: 232 MG/DL
GFR AFRICAN AMERICAN: 23 ML/MIN
GFR NON-AFRICAN AMERICAN: 19 ML/MIN
GFR SERPL CREATININE-BSD FRML MDRD: ABNORMAL ML/MIN/{1.73_M2}
GFR SERPL CREATININE-BSD FRML MDRD: ABNORMAL ML/MIN/{1.73_M2}
GLUCOSE BLD-MCNC: 253 MG/DL (ref 70–99)
HBA1C MFR BLD: 9.7 % (ref 4–6)
POTASSIUM SERPL-SCNC: 4.2 MMOL/L (ref 3.7–5.3)
SODIUM BLD-SCNC: 138 MMOL/L (ref 135–144)
TOTAL PROTEIN: 6.3 G/DL (ref 6.4–8.3)
TSH SERPL DL<=0.05 MIU/L-ACNC: 0.86 MIU/L (ref 0.3–5)

## 2022-04-03 NOTE — PROGRESS NOTES
[address] [facility] [place]gregor [facility] [address] [id number]  Dept: [phone]  Dept Fax: [phone]    22    Chief Complaint   Patient presents with    Procedure       Georgia Romero 64 y.o. has a complaint of postmenopausal bleeding. She had a pessary in place previously, but due to bleeding she had it removed 2 months ago by [facility] in our office. She did have another episode of bleeding a few weeks ago that lasted for a couple of days even with the pessary out. She had an ultrasound today that was unremarkable and showed a normal stripe. She is not sexually active. She is here because she would like a new pessary placed. Review of Systems   Constitutional: Negative for chills and fever. HENT: Negative for congestion. Respiratory: Negative for cough and shortness of breath. Cardiovascular: Negative for chest pain and palpitations. Gastrointestinal: Negative for abdominal pain. Genitourinary: Positive for vaginal bleeding. Musculoskeletal: Positive for arthralgias. Neurological: Negative for dizziness and light-headedness. Psychiatric/Behavioral: The patient is not nervous/anxious. Gynecologic History  No LMP recorded.  Patient is postmenopausal.  Contraception: post menopausal status  Last Pap: many years ago  Results: normal  Last Mammogram: 19  Results: normal    Obstetric History  : 12  Para: 6  AB: 6    Past Medical History:   Diagnosis Date    CRF (chronic renal failure)     Diabetes mellitus ([address]     Gout     Heart attack ([facility] 2021    Hyperlipidemia     Hypertension     Thyroid disease      Past Surgical History:   Procedure Laterality Date    BACK SURGERY      Lumbar herniated disc    CARDIAC SURGERY      CABG x4    CARPAL TUNNEL RELEASE      CATARACT REMOVAL      CORONARY ARTERY BYPASS GRAFT  04/13/2021    x4, Dr. Perry Jacob RESECTION       Allergies   Allergen Reactions    Hepatitis B Virus Vaccines Other (See Comments)     Skin ulcer    Amlodipine Besylate Rash    Hepatitis B Vaccine Other (See Comments)     Skin ulcer     Current Outpatient Medications   Medication Sig Dispense Refill    famotidine (PEPCID) 20 MG tablet TAKE 1 TABLET BY MOUTH  TWICE DAILY 180 tablet 3    insulin glargine (LANTUS SOLOSTAR) 100 UNIT/ML injection pen Inject 15 Units into the skin nightly 3 pen 0    gabapentin (NEURONTIN) 300 MG capsule Take 300 mg by mouth 2 times daily.  carvedilol (COREG) 25 MG tablet TAKE 1 TABLET BY MOUTH  TWICE DAILY WITH MEALS 180 tablet 3    rosuvastatin (CRESTOR) 10 MG tablet TAKE 1 TABLET BY MOUTH  DAILY 90 tablet 3    levothyroxine (SYNTHROID) 100 MCG tablet TAKE 1 TABLET BY MOUTH  DAILY 90 tablet 3    glipiZIDE (GLUCOTROL XL) 10 MG extended release tablet TAKE 1 TABLET BY MOUTH IN  THE MORNING AND 1 TABLET  ONCE NIGHTLY 180 tablet 3    hydrALAZINE (APRESOLINE) 50 MG tablet TAKE 1 TABLET BY MOUTH 3  TIMES DAILY 270 tablet 3    furosemide (LASIX) 40 MG tablet Take 40 mg by mouth 2 times daily (before meals)      loperamide (IMODIUM) 2 MG capsule Take 2 mg by mouth 4 times daily as needed for Diarrhea       aspirin 81 MG tablet Take 81 mg by mouth daily      blood glucose monitor strips Test 3 times a day & as needed for symptoms of irregular blood glucose. 100 strip 2    Handicap Placard MISC by Does not apply route EXPIRES IN 5 YEARS FROM ORIGINAL SCRIPT DATE 1 each 0    Blood Glucose Monitoring Suppl (ONE TOUCH ULTRA 2) w/Device KIT 1 kit by Does not apply route daily 1 kit 0    glucose monitoring kit (FREESTYLE) monitoring kit 1 kit by Does not apply route daily 1 kit 0     No current facility-administered medications for this visit. Social History     Socioeconomic History    Marital status:       Spouse name: Not on file    Number of children: Not on file    Years of education: Not on file    Highest education level: Not on file   Occupational History    Not on file   Tobacco Use    Smoking status: Never Smoker    Smokeless tobacco: Never Used   Substance and Sexual Activity    Alcohol use: No    Drug use: No    Sexual activity: Not Currently   Other Topics Concern    Not on file   Social History Narrative    Not on file     Social Determinants of Health     Financial Resource Strain: Medium Risk    Difficulty of Paying Living Expenses: Somewhat hard   Food Insecurity: No Food Insecurity    Worried About Running Out of Food in the Last Year: Never true    Radha of Food in the Last Year: Never true   Transportation Needs:     Lack of Transportation (Medical): Not on file    Lack of Transportation (Non-Medical): Not on file   Physical Activity:     Days of Exercise per Week: Not on file    Minutes of Exercise per Session: Not on file   Stress:     Feeling of Stress : Not on file   Social Connections:     Frequency of Communication with Friends and Family: Not on file    Frequency of Social Gatherings with Friends and Family: Not on file    Attends [employer] Services: Not on file    Active Member of [address] [place] [address] or Organizations: Not on file    Attends Club or Organization Meetings: Not on file    Marital Status: Not on file   Intimate Partner Violence:     Fear of Current or Ex-Partner: Not on file    Emotionally Abused: Not on file    Physically Abused: Not on file    Sexually Abused: Not on file   Housing Stability:     Unable to Pay for Housing in the Last Year: Not on file    Number of [place] in the Last Year: Not on file    Unstable Housing in the Last Year: Not on file     Family History   Problem Relation Age of Onset    Diabetes Mother     Other Mother         CAD   Desouza Cancer Brother         unknown    Heart Disease Father        Physical exam Physical Exam  Constitutional:       Appearance: Normal appearance. She is normal weight.    Genitourinary: Anterior and apical vaginal prolapse present. Mild vaginal atrophy present. Vaginal exam comments: 1 mm polyp structures localized to the patient's left side of the cervix. .        Right Adnexa: not tender and no mass present. Left Adnexa: not tender and no mass present. No cervical discharge or friability. HENT:      Head: Normocephalic. Eyes:      Extraocular Movements: Extraocular movements intact. Pulmonary:      Effort: Pulmonary effort is normal.   Neurological:      Mental Status: She is alert and oriented to person, place, and time. Psychiatric:         Mood and Affect: Mood normal.         Behavior: Behavior normal.         Thought Content: Thought content normal.         Judgment: Judgment normal.         Procedure:  Area cleaned with betadine. Kevorkian biopsy forceps used to obtain samples of the polypoid tissue. Tissue sent to pathology. Silver nitrate used to create hemostasis. Pt tolerated procedure well. Precautions given for pt to call office if heavy bleeding, pain, fevers, etc.     TVUS   FINDINGS:       Measurements:       Uterus: 7.4 x 4.6 x 4 cm       Endometrial stripe: 1.8 mm       Right Ovary:2.8 x 1.4 x 2.1 cm       Left Ovary: 3 x 1.5 x 2.2 cm           Ultrasound Findings:       Uterus: Uterus demonstrates slightly heterogeneous myometrial echotexture. There are few scattered calcifications peripherally.       Endometrial stripe: Endometrial stripe is within normal limits.       Right Ovary: Right ovary is within normal limits.       Left Ovary:  Left ovary is within normal limits.       Free Fluid: No evidence of free fluid.           Impression   Essentially unremarkable pelvic ultrasound. Assessment/Plan  1. PMB (postmenopausal bleeding)  - Likely secondary to polypoid tissue in the vaginal wall next to the cervix. - ES 1.8 mm  - Surgical Pathology; Future    2. Vaginal lesion  - Biopsy obtained    3. Uterine prolapse  - Stage 2    4. Prolapse of anterior vaginal wall  - Stage 2    Will call pt with results of biopsy. If normal and no bleeding will replace pessary.     Dennys Solano MD  [address]

## 2022-04-04 ENCOUNTER — PROCEDURE VISIT (OUTPATIENT)
Dept: OBGYN CLINIC | Age: 77
End: 2022-04-04
Payer: MEDICARE

## 2022-04-04 ENCOUNTER — HOSPITAL ENCOUNTER (OUTPATIENT)
Age: 77
Setting detail: SPECIMEN
Discharge: HOME | End: 2022-04-04

## 2022-04-04 VITALS
HEART RATE: 55 BPM | WEIGHT: 155 LBS | DIASTOLIC BLOOD PRESSURE: 62 MMHG | HEIGHT: 65 IN | BODY MASS INDEX: 25.83 KG/M2 | SYSTOLIC BLOOD PRESSURE: 170 MMHG

## 2022-04-04 DIAGNOSIS — N81.4 UTERINE PROLAPSE: ICD-10-CM

## 2022-04-04 DIAGNOSIS — N81.10 PROLAPSE OF ANTERIOR VAGINAL WALL: ICD-10-CM

## 2022-04-04 DIAGNOSIS — N95.0 PMB (POSTMENOPAUSAL BLEEDING): Primary | ICD-10-CM

## 2022-04-04 DIAGNOSIS — N89.8 VAGINAL LESION: ICD-10-CM

## 2022-04-04 PROCEDURE — 56605 BIOPSY OF VULVA/PERINEUM: CPT | Performed by: OBSTETRICS & GYNECOLOGY

## 2022-04-04 ASSESSMENT — ENCOUNTER SYMPTOMS
ABDOMINAL PAIN: 0
SHORTNESS OF BREATH: 0
COUGH: 0

## 2022-04-06 LAB — SURGICAL PATHOLOGY REPORT: NORMAL

## 2022-05-24 ENCOUNTER — PROCEDURE VISIT (OUTPATIENT)
Dept: OBGYN CLINIC | Age: 77
End: 2022-05-24
Payer: MEDICARE

## 2022-05-24 VITALS
SYSTOLIC BLOOD PRESSURE: 184 MMHG | HEIGHT: 65 IN | BODY MASS INDEX: 25.26 KG/M2 | DIASTOLIC BLOOD PRESSURE: 60 MMHG | WEIGHT: 151.6 LBS | HEART RATE: 55 BPM

## 2022-05-24 DIAGNOSIS — N81.10 PROLAPSE OF ANTERIOR VAGINAL WALL: ICD-10-CM

## 2022-05-24 DIAGNOSIS — N81.4 UTERINE PROLAPSE: ICD-10-CM

## 2022-05-24 DIAGNOSIS — Z46.89 ENCOUNTER FOR FITTING AND ADJUSTMENT OF PESSARY: Primary | ICD-10-CM

## 2022-05-24 PROCEDURE — 57160 INSERT PESSARY/OTHER DEVICE: CPT | Performed by: OBSTETRICS & GYNECOLOGY

## 2022-05-24 NOTE — PROGRESS NOTES
[address] [facility]  [facility] - [address] [facility] [address]  Dept: [phone]     OB/GYN   Procedure Note        James Murry   5/24/2022           Cricket Jessica DO       Subjective:   James Murry is a 64 y.o. female  [id number] here for pessary fitting. She previously had a pessary that was found to be eroding the vaginal wall. It was removed and discarded by another provider previously. She is here to be refitted for a smaller pessary to find one that fits more appropriately. Vitals:   Blood pressure (!) 181/63, pulse 55, height 5' 5\" (1.651 m), weight 151 lb 9.6 oz (68.8 kg), not currently breastfeeding. Procedure: Pessary fitting and sizing     Indications: stage 2 anterior and apical prolapse      Procedure Details: The patient was positioned comfortably on the exam table. After a bi-manual exam; the uterus and prolapse was reduced without difficulty. There was no cervical motion tenderness or adnexal masses and the bladder was smooth, non-tender and without palpable masses. Based on exam, the #6 ring with support pessary was chosen and placed. Patient ambulated and voided without difficulty with it in place. She was not able to place it and remove the pessary without assistance. Pessary was replaced without any difficulty. T.O.S. Ointment was placed with the pessary to decrease discharge/odor. The patient tolerated the procedure without difficulty.       Assessment & Plan:  James Murry is a 64 y.o. [id number] with anterior and apical prolapse              - ring with support pessary #6 placed without difficulty              - patient to return in 1 month for pessary maintenance and evaluation              - call or come in with any difficulty voiding, increased malodorous vaginal discharge, pain or bleeding     Patient Active Problem List   Diagnosis    Urinary incontinence    Type 2 diabetes mellitus with stage 3 chronic kidney disease, without long-term current use of insulin (HCC)    Acquired hypothyroidism    Mixed hyperlipidemia    Gastroesophageal reflux disease without esophagitis    Type 2 diabetes mellitus with diabetic polyneuropathy, without long-term current use of insulin (HCC)    Anemia due to stage 3 chronic kidney disease (HCC)    Coronary atherosclerosis    Ischemic cardiomyopathy    [facility]STEMI (non-ST elevated myocardial infarction) ([facility]    Chronic renal failure, stage 3b ([facility]    Renal hypertension    Uterine prolapse    Prolapse of anterior vaginal wall       Gregorio Barraza MD   [address] [place] [address]  5/24/2022 7:55 AM

## 2022-06-23 NOTE — PROGRESS NOTES
[address] [facility] - [address] [facility]  Dept: [phone]    Chief Complaint   Patient presents with    Follow-up       Blas Whitlock is a 63 yo female who presents for pessary maintenance. She was fitted for a pessary 1 month ago. She says she is doing well and not having any problems. She denies any vaginal bleeding or abnormal vaginal discharge. She is still happy with her pessary. Review of Systems   Constitutional: Negative for chills and fever. HENT: Negative for congestion. Respiratory: Negative for cough and shortness of breath. Cardiovascular: Negative for chest pain and palpitations. Gastrointestinal: Negative for abdominal pain. Genitourinary: Negative for pelvic pain, vaginal discharge and vaginal pain. Musculoskeletal: Negative for back pain. Neurological: Negative for dizziness and light-headedness. Psychiatric/Behavioral: The patient is not nervous/anxious. O:Blood pressure (!) 151/52, pulse 64, height 5' 5\" (1.651 m), weight 154 lb (69.9 kg), not currently breastfeeding. Gen: alert, no apparent distress  Pelvic:  There is not any signs of infection; The vaginal vault is without any signs of erythema or erosion. There is no vaginal discharge or odor appreciated. Pessary removed easily and cleansed with water and soap. Pessary replaced without difficulty. T.O.S. Ointment was placed with the pessary to decreasedischarge/odor. A/P:   Diagnosis Orders   1. Prolapse of anterior vaginal wall     2.  Pessary maintenance         Pt to follow up in 3 months for pessary follow up  Silvestre Lanier MD  [address]

## 2022-06-24 ENCOUNTER — OFFICE VISIT (OUTPATIENT)
Dept: OBGYN CLINIC | Age: 77
End: 2022-06-24
Payer: MEDICARE

## 2022-06-24 VITALS
WEIGHT: 154 LBS | HEIGHT: 65 IN | HEART RATE: 64 BPM | SYSTOLIC BLOOD PRESSURE: 151 MMHG | DIASTOLIC BLOOD PRESSURE: 52 MMHG | BODY MASS INDEX: 25.66 KG/M2

## 2022-06-24 DIAGNOSIS — Z46.89 PESSARY MAINTENANCE: ICD-10-CM

## 2022-06-24 DIAGNOSIS — N81.10 PROLAPSE OF ANTERIOR VAGINAL WALL: Primary | ICD-10-CM

## 2022-06-24 PROBLEM — I50.22 CHRONIC SYSTOLIC (CONGESTIVE) HEART FAILURE (HCC): Status: ACTIVE | Noted: 2022-06-24

## 2022-06-24 PROBLEM — N18.4 CKD (CHRONIC KIDNEY DISEASE) STAGE 4, GFR 15-29 ML/MIN (HCC): Status: ACTIVE | Noted: 2022-06-24

## 2022-06-24 PROCEDURE — 1090F PRES/ABSN URINE INCON ASSESS: CPT | Performed by: OBSTETRICS & GYNECOLOGY

## 2022-06-24 PROCEDURE — G8417 CALC BMI ABV UP PARAM F/U: HCPCS | Performed by: OBSTETRICS & GYNECOLOGY

## 2022-06-24 PROCEDURE — 1036F TOBACCO NON-USER: CPT | Performed by: OBSTETRICS & GYNECOLOGY

## 2022-06-24 PROCEDURE — G8427 DOCREV CUR MEDS BY ELIG CLIN: HCPCS | Performed by: OBSTETRICS & GYNECOLOGY

## 2022-06-24 PROCEDURE — G8400 PT W/DXA NO RESULTS DOC: HCPCS | Performed by: OBSTETRICS & GYNECOLOGY

## 2022-06-24 PROCEDURE — 99213 OFFICE O/P EST LOW 20 MIN: CPT | Performed by: OBSTETRICS & GYNECOLOGY

## 2022-06-24 PROCEDURE — 1123F ACP DISCUSS/DSCN MKR DOCD: CPT | Performed by: OBSTETRICS & GYNECOLOGY

## 2022-06-24 ASSESSMENT — ENCOUNTER SYMPTOMS
ABDOMINAL PAIN: 0
BACK PAIN: 0
COUGH: 0
SHORTNESS OF BREATH: 0

## 2022-09-24 NOTE — PROGRESS NOTES
600 [facility] - [address] [facility]  Dept: [phone]    Chief Complaint   Patient presents with    Follow-up     pessary         Rossi Rascon is a 77 yo female who presents for pessary maintenance. She was last here 3 months ago. She says she is doing well and not having any problems. She denies any vaginal bleeding or abnormal vaginal discharge. She is still happy with her pessary. She says that she will likely be put on dialysis later this year with her kidney issues and is very nervous. Review of Systems   Constitutional:  Negative for chills and fever. HENT:  Negative for congestion. Respiratory:  Negative for cough and shortness of breath. Cardiovascular:  Negative for chest pain and palpitations. Gastrointestinal:  Negative for abdominal pain. Genitourinary:  Negative for pelvic pain and vaginal discharge. Musculoskeletal:  Positive for arthralgias. Neurological:  Negative for dizziness and light-headedness. Psychiatric/Behavioral:  The patient is not nervous/anxious. O:Blood pressure (!) 174/69, pulse 57, height 5' 5\" (1.651 m), weight 155 lb (70.3 kg), not currently breastfeeding. Gen: alert, no apparent distress  Pelvic:  There is not any signs of infection; The vaginal vault is without any signs of erythema or erosion. There is no vaginal discharge or odor appreciated. Pessary removed easily and cleansed with water and soap. Pessary replaced without difficulty. T.O.S. Ointment was placed with the pessary to decreasedischarge/odor. A/P:   Diagnosis Orders   1. Prolapse of anterior vaginal wall        2. Uterine prolapse        3.  Pessary maintenance          Pt to follow up in 3 months    Merlinda Signs, MD  [address]

## 2022-09-26 ENCOUNTER — OFFICE VISIT (OUTPATIENT)
Dept: OBGYN CLINIC | Age: 77
End: 2022-09-26
Payer: MEDICARE

## 2022-09-26 VITALS
HEIGHT: 65 IN | WEIGHT: 155 LBS | SYSTOLIC BLOOD PRESSURE: 174 MMHG | HEART RATE: 57 BPM | DIASTOLIC BLOOD PRESSURE: 69 MMHG | BODY MASS INDEX: 25.83 KG/M2

## 2022-09-26 DIAGNOSIS — Z46.89 PESSARY MAINTENANCE: ICD-10-CM

## 2022-09-26 DIAGNOSIS — N81.10 PROLAPSE OF ANTERIOR VAGINAL WALL: Primary | ICD-10-CM

## 2022-09-26 DIAGNOSIS — N81.4 UTERINE PROLAPSE: ICD-10-CM

## 2022-09-26 PROCEDURE — 99212 OFFICE O/P EST SF 10 MIN: CPT | Performed by: OBSTETRICS & GYNECOLOGY

## 2022-09-26 PROCEDURE — 1123F ACP DISCUSS/DSCN MKR DOCD: CPT | Performed by: OBSTETRICS & GYNECOLOGY

## 2022-09-26 PROCEDURE — G8417 CALC BMI ABV UP PARAM F/U: HCPCS | Performed by: OBSTETRICS & GYNECOLOGY

## 2022-09-26 PROCEDURE — G8427 DOCREV CUR MEDS BY ELIG CLIN: HCPCS | Performed by: OBSTETRICS & GYNECOLOGY

## 2022-09-26 PROCEDURE — 1090F PRES/ABSN URINE INCON ASSESS: CPT | Performed by: OBSTETRICS & GYNECOLOGY

## 2022-09-26 PROCEDURE — 1036F TOBACCO NON-USER: CPT | Performed by: OBSTETRICS & GYNECOLOGY

## 2022-09-26 PROCEDURE — G8400 PT W/DXA NO RESULTS DOC: HCPCS | Performed by: OBSTETRICS & GYNECOLOGY

## 2022-09-26 ASSESSMENT — ENCOUNTER SYMPTOMS
SHORTNESS OF BREATH: 0
ABDOMINAL PAIN: 0
COUGH: 0

## 2022-11-14 PROBLEM — I13.0 HYPERTENSIVE HEART AND RENAL DISEASE WITH CONGESTIVE HEART FAILURE (HCC): Status: ACTIVE | Noted: 2022-02-09

## 2023-01-05 NOTE — TELEPHONE ENCOUNTER
Spoke with pt and they declined to schedule at this time - 3rd attempt.
Writer called in attempt to schedule patient from recent referral received. Detailed message left for patient to call [phone] opt 1 to schedule NP appointment.  NP letter sent
Detail Level: Detailed
Quality 130: Documentation Of Current Medications In The Medical Record: Current Medications Documented
Quality 110: Preventive Care And Screening: Influenza Immunization: Influenza Immunization previously received during influenza season

## 2023-02-07 NOTE — PROGRESS NOTES
[facility] - [address] [facility]  Dept: [phone]    Chief Complaint   Patient presents with    Follow-up       Susan Srivastava is a 77 yo female who presents for pessary maintenance. She was last here 5 months ago. She says she is doing well and not having any problems with her pessary. She denies any vaginal bleeding or abnormal vaginal discharge. She is still happy with her pessary. She says her cardiologist started her on diuretics and she is feeling much better and her legs are no longer swollen. Review of Systems   Constitutional:  Negative for chills and fever. HENT:  Negative for congestion. Respiratory:  Negative for cough and shortness of breath. Cardiovascular:  Negative for chest pain and palpitations. Gastrointestinal:  Negative for abdominal pain. Genitourinary:  Negative for pelvic pain and vaginal discharge. Musculoskeletal:  Positive for arthralgias. Neurological:  Negative for dizziness and light-headedness. Psychiatric/Behavioral:  The patient is not nervous/anxious. Past Medical History:   Diagnosis Date    CRF (chronic renal failure)     Diabetes mellitus (HCC)     Gout     Heart attack ([facility] [address] [facility] 02/2021    Hyperlipidemia     Hypertension     Thyroid disease        Current Outpatient Medications on File Prior to Visit   Medication Sig Dispense Refill    rosuvastatin (CRESTOR) 10 MG tablet TAKE 1 TABLET BY MOUTH  DAILY 90 tablet 3    vitamin D (ERGOCALCIFEROL) 1.25 MG (91362 UT) CAPS capsule       doxycycline hyclate (VIBRAMYCIN) 100 MG capsule       carvedilol (COREG) 25 MG tablet TAKE 1 TABLET BY MOUTH  TWICE DAILY WITH MEALS 180 tablet 3    levothyroxine (SYNTHROID) 100 MCG tablet TAKE 1 TABLET BY MOUTH  DAILY 90 tablet 3    glipiZIDE (GLUCOTROL XL) 10 MG extended release tablet Take 1 tablet by mouth in the morning and 1 tablet before bedtime.  180 tablet 3 blood glucose test strips (ONETOUCH ULTRA) strip TEST 3 TIMES DAILY AND AS  NEEDED FOR SYMPTOMS OF  IRREGULAR BLOOD GLUCOSE 300 strip 3    insulin glargine (LANTUS SOLOSTAR) 100 UNIT/ML injection pen Inject 15 Units into the skin nightly 3 pen 2    hydrALAZINE (APRESOLINE) 50 MG tablet TAKE 1 TABLET BY MOUTH 3  TIMES DAILY 270 tablet 3    famotidine (PEPCID) 20 MG tablet TAKE 1 TABLET BY MOUTH  TWICE DAILY 180 tablet 3    gabapentin (NEURONTIN) 300 MG capsule Take 300 mg by mouth in the morning and 300 mg before bedtime. furosemide (LASIX) 40 MG tablet Take 40 mg by mouth 2 times daily (before meals)      loperamide (IMODIUM) 2 MG capsule Take 2 mg by mouth 4 times daily as needed for Diarrhea       Handicap Placard MISC by Does not apply route EXPIRES IN 5 YEARS FROM ORIGINAL SCRIPT DATE 1 each 0    Blood Glucose Monitoring Suppl (ONE TOUCH ULTRA 2) w/Device KIT 1 kit by Does not apply route daily 1 kit 0    glucose monitoring kit (FREESTYLE) monitoring kit 1 kit by Does not apply route daily 1 kit 0    aspirin 81 MG tablet Take 81 mg by mouth daily       No current facility-administered medications on file prior to visit. Past Surgical History:   Procedure Laterality Date    BACK SURGERY      Lumbar herniated disc    CARDIAC SURGERY      CABG x4    CARPAL TUNNEL RELEASE      CATARACT REMOVAL      CORONARY ARTERY BYPASS GRAFT  04/13/2021    x4, Dr. Walker Fear History     Socioeconomic History    Marital status:       Spouse name: Not on file    Number of children: Not on file    Years of education: Not on file    Highest education level: Not on file   Occupational History    Not on file   Tobacco Use    Smoking status: Never    Smokeless tobacco: Never   Substance and Sexual Activity    Alcohol use: No    Drug use: No    Sexual activity: Not Currently   Other Topics Concern    Not on file   Social History Narrative    Not on file     Social Determinants of Health     Financial Resource Strain: Low Risk     Difficulty of Paying Living Expenses: Not hard at all   Food Insecurity: No Food Insecurity    Worried About Running Out of Food in the Last Year: Never true    Ran Out of Food in the Last Year: Never true   Transportation Needs: Not on file   Physical Activity: Not on file   Stress: Not on file   Social Connections: Not on file   Intimate Partner Violence: Not on file   Housing Stability: Not on file       O:Blood pressure (!) 171/74, pulse 60, height 5' 5\" (1.651 m), weight 147 lb (66.7 kg), not currently breastfeeding. Gen: alert, no apparent distress  Pelvic:  There is not any signs of infection; The vaginal vault is without any signs of erythema or erosion. There is no vaginal discharge or odor appreciated. Pessary removed easily and cleansed with water and soap. Pessary replaced without difficulty. T.O.S. Ointment was placed with the pessary to decreasedischarge/odor. A/P:   Diagnosis Orders   1. Prolapse of anterior vaginal wall        2.  Uterine prolapse            Pt to follow up in 3 months  Joby Mckenzie MD  [address]

## 2023-02-08 ENCOUNTER — OFFICE VISIT (OUTPATIENT)
Dept: OBGYN CLINIC | Age: 78
End: 2023-02-08
Payer: MEDICARE

## 2023-02-08 VITALS
BODY MASS INDEX: 24.49 KG/M2 | WEIGHT: 147 LBS | HEART RATE: 60 BPM | SYSTOLIC BLOOD PRESSURE: 171 MMHG | DIASTOLIC BLOOD PRESSURE: 74 MMHG | HEIGHT: 65 IN

## 2023-02-08 DIAGNOSIS — N81.4 UTERINE PROLAPSE: ICD-10-CM

## 2023-02-08 DIAGNOSIS — N81.10 PROLAPSE OF ANTERIOR VAGINAL WALL: Primary | ICD-10-CM

## 2023-02-08 PROCEDURE — 1036F TOBACCO NON-USER: CPT | Performed by: OBSTETRICS & GYNECOLOGY

## 2023-02-08 PROCEDURE — G8427 DOCREV CUR MEDS BY ELIG CLIN: HCPCS | Performed by: OBSTETRICS & GYNECOLOGY

## 2023-02-08 PROCEDURE — 99213 OFFICE O/P EST LOW 20 MIN: CPT | Performed by: OBSTETRICS & GYNECOLOGY

## 2023-02-08 PROCEDURE — G8400 PT W/DXA NO RESULTS DOC: HCPCS | Performed by: OBSTETRICS & GYNECOLOGY

## 2023-02-08 PROCEDURE — 1090F PRES/ABSN URINE INCON ASSESS: CPT | Performed by: OBSTETRICS & GYNECOLOGY

## 2023-02-08 PROCEDURE — 1123F ACP DISCUSS/DSCN MKR DOCD: CPT | Performed by: OBSTETRICS & GYNECOLOGY

## 2023-02-08 PROCEDURE — G8420 CALC BMI NORM PARAMETERS: HCPCS | Performed by: OBSTETRICS & GYNECOLOGY

## 2023-02-08 PROCEDURE — G8484 FLU IMMUNIZE NO ADMIN: HCPCS | Performed by: OBSTETRICS & GYNECOLOGY

## 2023-02-08 ASSESSMENT — ENCOUNTER SYMPTOMS
COUGH: 0
SHORTNESS OF BREATH: 0
ABDOMINAL PAIN: 0

## 2023-03-15 ENCOUNTER — HOSPITAL ENCOUNTER (OUTPATIENT)
Dept: GENERAL RADIOLOGY | Facility: CLINIC | Age: 78
Discharge: HOME | End: 2023-03-17
Payer: MEDICARE

## 2023-03-15 DIAGNOSIS — N18.5 CHRONIC KIDNEY DISEASE (CKD), STAGE V (HCC): ICD-10-CM

## 2023-03-15 PROCEDURE — 71046 X-RAY EXAM CHEST 2 VIEWS: CPT

## 2023-04-19 ENCOUNTER — HOSPITAL ENCOUNTER (EMERGENCY)
Facility: CLINIC | Age: 78
Discharge: HOME | End: 2023-04-19
Attending: EMERGENCY MEDICINE
Payer: MEDICARE

## 2023-04-19 ENCOUNTER — APPOINTMENT (OUTPATIENT)
Dept: GENERAL RADIOLOGY | Facility: CLINIC | Age: 78
End: 2023-04-19
Payer: MEDICARE

## 2023-04-19 VITALS
HEART RATE: 95 BPM | RESPIRATION RATE: 16 BRPM | OXYGEN SATURATION: 95 % | TEMPERATURE: 98.6 F | SYSTOLIC BLOOD PRESSURE: 122 MMHG | WEIGHT: 149.1 LBS | BODY MASS INDEX: 24.84 KG/M2 | HEIGHT: 65 IN | DIASTOLIC BLOOD PRESSURE: 99 MMHG

## 2023-04-19 DIAGNOSIS — M76.62 ACHILLES TENDINITIS OF LEFT LOWER EXTREMITY: ICD-10-CM

## 2023-04-19 DIAGNOSIS — M25.472 LEFT ANKLE SWELLING: Primary | ICD-10-CM

## 2023-04-19 LAB
ABSOLUTE EOS #: 0.1 K/UL (ref 0–0.4)
ABSOLUTE LYMPH #: 1.7 K/UL (ref 1–4.8)
ABSOLUTE MONO #: 0.6 K/UL (ref 0.1–1.2)
ANION GAP SERPL CALCULATED.3IONS-SCNC: 7 MMOL/L (ref 9–17)
BASOPHILS # BLD: 1 % (ref 0–2)
BASOPHILS ABSOLUTE: 0 K/UL (ref 0–0.2)
BUN SERPL-MCNC: 20 MG/DL (ref 8–23)
CALCIUM SERPL-MCNC: 9.3 MG/DL (ref 8.6–10.4)
CHLORIDE SERPL-SCNC: 98 MMOL/L (ref 98–107)
CO2 SERPL-SCNC: 33 MMOL/L (ref 20–31)
CREAT SERPL-MCNC: 2 MG/DL (ref 0.5–0.9)
EOSINOPHILS RELATIVE PERCENT: 2 % (ref 1–4)
GFR SERPL CREATININE-BSD FRML MDRD: 25 ML/MIN/1.73M2
GLUCOSE SERPL-MCNC: 171 MG/DL (ref 70–99)
HCT VFR BLD AUTO: 26.5 % (ref 36–46)
HGB BLD-MCNC: 9 G/DL (ref 12–16)
LACTIC ACID, SEPSIS: 1.3 MMOL/L (ref 0.5–1.9)
LYMPHOCYTES # BLD: 27 % (ref 24–44)
MCH RBC QN AUTO: 30.7 PG (ref 26–34)
MCHC RBC AUTO-ENTMCNC: 33.9 G/DL (ref 31–37)
MCV RBC AUTO: 90.5 FL (ref 80–100)
MONOCYTES # BLD: 9 % (ref 2–11)
PDW BLD-RTO: 14.6 % (ref 12.5–15.4)
PLATELET # BLD AUTO: 214 K/UL (ref 140–450)
PMV BLD AUTO: 9.4 FL (ref 6–12)
POTASSIUM SERPL-SCNC: 4.2 MMOL/L (ref 3.7–5.3)
RBC # BLD: 2.93 M/UL (ref 4–5.2)
SEG NEUTROPHILS: 61 % (ref 36–66)
SEGMENTED NEUTROPHILS ABSOLUTE COUNT: 3.9 K/UL (ref 1.8–7.7)
SODIUM SERPL-SCNC: 138 MMOL/L (ref 135–144)
WBC # BLD AUTO: 6.4 K/UL (ref 3.5–11)

## 2023-04-19 PROCEDURE — 36415 COLL VENOUS BLD VENIPUNCTURE: CPT

## 2023-04-19 PROCEDURE — 96365 THER/PROPH/DIAG IV INF INIT: CPT

## 2023-04-19 PROCEDURE — 83605 ASSAY OF LACTIC ACID: CPT

## 2023-04-19 PROCEDURE — 73630 X-RAY EXAM OF FOOT: CPT

## 2023-04-19 PROCEDURE — 87040 BLOOD CULTURE FOR BACTERIA: CPT

## 2023-04-19 PROCEDURE — 80048 BASIC METABOLIC PNL TOTAL CA: CPT

## 2023-04-19 PROCEDURE — 2580000003 HC RX 258: Performed by: REGISTERED NURSE

## 2023-04-19 PROCEDURE — 6360000002 HC RX W HCPCS: Performed by: REGISTERED NURSE

## 2023-04-19 PROCEDURE — 99284 EMERGENCY DEPT VISIT MOD MDM: CPT

## 2023-04-19 PROCEDURE — 85025 COMPLETE CBC W/AUTO DIFF WBC: CPT

## 2023-04-19 PROCEDURE — 73610 X-RAY EXAM OF ANKLE: CPT

## 2023-04-19 RX ORDER — HYDROCODONE BITARTRATE AND ACETAMINOPHEN 5; 325 MG/1; MG/1
1 TABLET ORAL EVERY 6 HOURS PRN
Qty: 10 TABLET | Refills: 0 | Status: SHIPPED | OUTPATIENT
Start: 2023-04-19 | End: 2023-04-22

## 2023-04-19 RX ADMIN — CEFTRIAXONE SODIUM 1000 MG: 1 INJECTION, POWDER, FOR SOLUTION INTRAMUSCULAR; INTRAVENOUS at 13:25

## 2023-04-19 ASSESSMENT — PAIN - FUNCTIONAL ASSESSMENT
PAIN_FUNCTIONAL_ASSESSMENT: 0-10
PAIN_FUNCTIONAL_ASSESSMENT: ACTIVITIES ARE NOT PREVENTED

## 2023-04-19 ASSESSMENT — ENCOUNTER SYMPTOMS
COLOR CHANGE: 0
BACK PAIN: 0
ABDOMINAL PAIN: 0
SHORTNESS OF BREATH: 0
DIARRHEA: 0
VOMITING: 0
NAUSEA: 0
COUGH: 0

## 2023-04-19 ASSESSMENT — PAIN SCALES - GENERAL: PAINLEVEL_OUTOF10: 6

## 2023-04-19 ASSESSMENT — PAIN DESCRIPTION - ORIENTATION: ORIENTATION: LEFT

## 2023-04-19 ASSESSMENT — PAIN DESCRIPTION - ONSET: ONSET: ON-GOING

## 2023-04-19 ASSESSMENT — PAIN DESCRIPTION - FREQUENCY: FREQUENCY: CONTINUOUS

## 2023-04-19 ASSESSMENT — PAIN DESCRIPTION - PAIN TYPE: TYPE: ACUTE PAIN

## 2023-04-19 ASSESSMENT — PAIN DESCRIPTION - DESCRIPTORS: DESCRIPTORS: ACHING;TINGLING

## 2023-04-19 ASSESSMENT — PAIN DESCRIPTION - LOCATION: LOCATION: ANKLE

## 2023-04-19 NOTE — DISCHARGE INSTRUCTIONS
Please understand that at this time there is no evidence for a more serious underlying process, but that early in the process of an illness or injury, an emergency department workup can be falsely reassuring. You should contact your family doctor within the next 48 hours for a follow up appointment    Surekha Ochoa!!!    From [facility] ([facility]) and [facility]    On behalf of the Emergency Department staff at [facility]), I would like to thank you for giving us the opportunity to address your health care needs and concerns. We hope that during your visit, our service was delivered in a professional and caring manner. Please keep [facility] ([facility]) in mind as we walk with you down the path to your own personal wellness. Please expect an automated text message or email from us so we can ask a few questions about your health and progress. Based on your answers, a clinician may call you back to offer help and instructions. Please understand that early in the process of an illness or injury, an emergency department workup can be falsely reassuring. If you notice any worsening, changing or persistent symptoms please call your family doctor or return to the ER immediately. Tell us how we did during your visit at [email or web address]   and let us know about your experience

## 2023-04-19 NOTE — ED PROVIDER NOTES
Attending Supervisory Note/Shared Visit   I have personally performed a face to face diagnostic evaluation on this patient. I have reviewed the mid-levels findings and agree. History and Exam by me shows an alert and oriented patient seen with extender I agree with the assessment treatment plan and disposition    Patient is a diabetic and there is improper fitting shoes she has been told by her podiatrist that she should wear more supportive shoes however she does not wear them. And she also wears a with no socks. She is noticing increasing pain on the dorsum of her foot without history of trauma. As well as moderate onychomycosis of her toenails  There is a tender spot on the dorsum of the foot with mild erythema. We will get x-rays and do a septic work-up and initiate antibiotic therapy.   (Please note that portions of this note were completed with a voice recognition program.  Efforts were made to edit the dictations but occasionally words are mis-transcribed.)    Bora Rowland MD  Attending Emergency Physician       Bora Rowland MD  04/19/23 0062

## 2023-04-19 NOTE — ED PROVIDER NOTES
[facility]  [facility]  Phone: [phone]        Pt Name: Bee Pastor  MRN: [id number]  [place] 1945  Date of evaluation: 23    [address]       Chief Complaint   Patient presents with    Ankle Pain    Joint Swelling       HISTORY OF PRESENT ILLNESS (Location/Symptom, Timing/Onset, Context/Setting, Quality, Duration, Modifying Factors, Severity)      Bee Pastor is a 76 y.o. female with no pertinent PMH who presents to the ED via private auto with left ankle discomfort ongoing for last 2 days. Patient states that she was driving a car when she started having aching in her left ankle and has had pain when walking. She does report increased swelling. Patient states that when she went to a full dialysis treatment today and still had discomfort and like to come to ER to be evaluated. She has been taking Tylenol at home with some relief of symptoms. She denies any chest pain, shortness of breath, difficult breathing, abdominal pain, fevers or chills. PAST MEDICAL / SURGICAL / SOCIAL / FAMILY HISTORY     PMH:  has a past medical history of CRF (chronic renal failure), Diabetes mellitus ([address] Gout, Heart attack ([address] Hyperlipidemia, Hypertension, and Thyroid disease. Surgical History:  has a past surgical history that includes back surgery; Carpal tunnel release; Foot surgery; lipoma resection; Cataract removal; Coronary artery bypass graft (2021); and Cardiac surgery. Social History:  reports that she has never smoked. She has never used smokeless tobacco. She reports that she does not drink alcohol and does not use drugs. Family History: She indicated that her mother is . She indicated that her father is . She indicated that the status of her brother is unknown.   family history includes Cancer in her brother; Diabetes in her mother; Heart Disease in her father; Other in her mother.   Psychiatric History: None    Allergies:

## 2023-04-19 NOTE — ED NOTES
Patient to ED via self to room 11  Here for complaint of right ankle pain and swelling  Patient denies any injury that she is aware of but states the pain and swelling began this previous Sunday without improvement of symptoms  Denies CP, SOB, or N/V  States she is recently on dialysis due to kidney failure with diabetes    Vitals obtained and call light provided  Patient resting comfortably on stretcher in no apparent distress  Respirations even and non-labored  Provider at bedside for evaluation     Allison Joseph RN  04/19/23 0239

## 2023-04-20 ENCOUNTER — CARE COORDINATION (OUTPATIENT)
Dept: CARE COORDINATION | Age: 78
End: 2023-04-20

## 2023-04-21 RX ORDER — FUROSEMIDE 80 MG
1 TABLET ORAL DAILY
COMMUNITY
Start: 2023-03-31

## 2023-04-21 NOTE — CARE COORDINATION
Ambulatory Care Coordination  ED Follow up Call    Reason for ED visit:  Left ankle swelling , Achilles tendonitis of left lower extremity- [facility] ED 4/19/2023   Status:     not changed    Did you call your PCP prior to going to the ED? No      Did you receive a discharge instructions from the Emergency Room? Yes  Review of Instructions:     Understands what to report/when to return?:  Yes   Understands discharge instructions?:  Yes   Following discharge instructions?:  Yes   If not why? Are there any new complaints of pain? No  New Pain Meds? Yes    Constipation prophylaxis needed? N/A    If you have a wound is the dressing clean, dry, and intact? N/A  Understands wound care regimen? N/A    Are there any other complaints/concerns that you wish to tell your provider? Challenges to be reviewed by the provider   Additional needs identified to be addressed with provider No  none                   FU appts/Provider:    Future Appointments   Date Time Provider Nolvia Willoughby   5/10/2023  7:30 AM MD Diann Gabriel OB/Gyn [facility]   6/15/2023  8:45 AM Aline Sweeney, DO [facility] None           New Medications?:   Yes - Norco       Medication Reconciliation by phone - Yes  Understands Medications? Yes  Taking Medications? Yes  Can you swallow your pills? Yes    Any further needs in the home i.e. Equipment? No    Link to services in community?:  Yes   Which services:  dialysis    Spoke to patient who states she was driving her car and her ankle started aching and was painful when walking, 2 days prior to ED visit. She denies having any injury. States she was told to f/u with Dr. Jumana Vasquez and her daughter called them for appt and they said they need the ED report. States they only gave her 10 pain pills and she has 3 left, she is also taking tylenol, icing ankle and has a boot on. States she just saw Dr. Jumana Vasquez on 3/27 and had left knee injection.    Offered to make office appointment and

## 2023-04-23 LAB
MICROORGANISM SPEC CULT: NORMAL
MICROORGANISM SPEC CULT: NORMAL
SERVICE CMNT-IMP: NORMAL
SERVICE CMNT-IMP: NORMAL
SPECIMEN DESCRIPTION: NORMAL
SPECIMEN DESCRIPTION: NORMAL

## 2023-04-25 ENCOUNTER — OFFICE VISIT (OUTPATIENT)
Dept: ORTHOPEDIC SURGERY | Age: 78
End: 2023-04-25
Payer: MEDICARE

## 2023-04-25 VITALS — WEIGHT: 149 LBS | OXYGEN SATURATION: 100 % | BODY MASS INDEX: 24.83 KG/M2 | RESPIRATION RATE: 16 BRPM | HEIGHT: 65 IN

## 2023-04-25 DIAGNOSIS — L03.818 CELLULITIS OF OTHER SPECIFIED SITE: Primary | ICD-10-CM

## 2023-04-25 DIAGNOSIS — M79.89 SWELLING OF LOWER EXTREMITY: ICD-10-CM

## 2023-04-25 DIAGNOSIS — M19.079 ARTHRITIS OF MIDFOOT: Primary | ICD-10-CM

## 2023-04-25 DIAGNOSIS — M25.572 LEFT ANKLE PAIN, UNSPECIFIED CHRONICITY: Primary | ICD-10-CM

## 2023-04-25 DIAGNOSIS — L03.818 CELLULITIS OF OTHER SPECIFIED SITE: ICD-10-CM

## 2023-04-25 PROCEDURE — 1036F TOBACCO NON-USER: CPT | Performed by: ORTHOPAEDIC SURGERY

## 2023-04-25 PROCEDURE — 1090F PRES/ABSN URINE INCON ASSESS: CPT | Performed by: ORTHOPAEDIC SURGERY

## 2023-04-25 PROCEDURE — G8400 PT W/DXA NO RESULTS DOC: HCPCS | Performed by: ORTHOPAEDIC SURGERY

## 2023-04-25 PROCEDURE — G8420 CALC BMI NORM PARAMETERS: HCPCS | Performed by: ORTHOPAEDIC SURGERY

## 2023-04-25 PROCEDURE — 1123F ACP DISCUSS/DSCN MKR DOCD: CPT | Performed by: ORTHOPAEDIC SURGERY

## 2023-04-25 PROCEDURE — G8427 DOCREV CUR MEDS BY ELIG CLIN: HCPCS | Performed by: ORTHOPAEDIC SURGERY

## 2023-04-25 PROCEDURE — 99204 OFFICE O/P NEW MOD 45 MIN: CPT | Performed by: ORTHOPAEDIC SURGERY

## 2023-04-25 RX ORDER — CEPHALEXIN 500 MG/1
500 CAPSULE ORAL 4 TIMES DAILY
Qty: 40 CAPSULE | Refills: 0 | Status: SHIPPED | OUTPATIENT
Start: 2023-04-25

## 2023-04-25 NOTE — PROGRESS NOTES
[address] [place] [address] AND SPORTS MEDICINE  [facility] Greta Crowley  [address] [address]  Dept: [phone]    Ambulatory Orthopedic Consult      CHIEF COMPLAINT:    Chief Complaint   Patient presents with    Ankle Pain     Left        HISTORY OF PRESENT ILLNESS:      The patient is a 76 y.o. female who is being seen for evaluation of the above, which began 4/16/2023 atraumatically  . At today's visit, she is using no brace/assistive device. History is obtained today from:   [x]  the patient     [x]  EMR     [x]  one family member/friend    []  multiple family members/friends    []  other: At today's visit, she localizes the pain to the left dorsal midfoot and medial hindfoot. She denies any fevers, chills, night sweats. REVIEW OF SYSTEMS:  Musculoskeletal: See HPI for pertinent positives     Past Medical History:    She  has a past medical history of CRF (chronic renal failure), Diabetes mellitus ([facility] Gout, Heart attack ([facility] (02/2021), Hyperlipidemia, Hypertension, and Thyroid disease. Past Surgical History:    She  has a past surgical history that includes back surgery; Carpal tunnel release; Foot surgery; lipoma resection; Cataract removal; Coronary artery bypass graft (04/13/2021); and Cardiac surgery. Current Medications:     Current Outpatient Medications:     oxyCODONE-acetaminophen (PERCOCET) 5-325 MG per tablet, Take 1 tablet by mouth every 8 hours as needed for Pain for up to 3 days.  Max Daily Amount: 3 tablets, Disp: 10 tablet, Rfl: 0    furosemide (LASIX) 80 MG tablet, Take 1 tablet by mouth daily, Disp: , Rfl:     hydrALAZINE (APRESOLINE) 50 MG tablet, TAKE 1 TABLET BY MOUTH 3  TIMES DAILY, Disp: 270 tablet, Rfl: 3    famotidine (PEPCID) 20 MG tablet, TAKE 1 TABLET BY MOUTH  TWICE DAILY, Disp: 180 tablet, Rfl: 3    rosuvastatin (CRESTOR) 10 MG tablet, TAKE 1 TABLET BY MOUTH  DAILY, Disp: 90 tablet, Rfl: 3

## 2023-05-03 ENCOUNTER — CARE COORDINATION (OUTPATIENT)
Dept: CARE COORDINATION | Age: 78
End: 2023-05-03

## 2023-05-03 NOTE — CARE COORDINATION
Attempted to contact patient for Care Management update, no answer, lvm to return call to this nurse at [phone]. If no return call, ACM will attempt to contact patient again in a few days.      Future Appointments   Date Time Provider Nolvia Willoughby   5/9/2023  9:00 AM Donte Silva MD Sports Med [facility]   5/10/2023  7:30 AM Allen Hamilton MD Syl OB/Gyn [facility]   6/15/2023  8:45 AM Lazarus Offer, DO [facility] None

## 2023-05-09 ENCOUNTER — OFFICE VISIT (OUTPATIENT)
Dept: ORTHOPEDIC SURGERY | Age: 78
End: 2023-05-09
Payer: MEDICARE

## 2023-05-09 VITALS — WEIGHT: 148 LBS | BODY MASS INDEX: 24.66 KG/M2 | RESPIRATION RATE: 16 BRPM | HEIGHT: 65 IN | OXYGEN SATURATION: 100 %

## 2023-05-09 DIAGNOSIS — M79.89 SWELLING OF LOWER EXTREMITY: ICD-10-CM

## 2023-05-09 DIAGNOSIS — M19.079 ARTHRITIS OF MIDFOOT: Primary | ICD-10-CM

## 2023-05-09 DIAGNOSIS — L03.818 CELLULITIS OF OTHER SPECIFIED SITE: Primary | ICD-10-CM

## 2023-05-09 DIAGNOSIS — M19.079 ARTHRITIS OF MIDFOOT: ICD-10-CM

## 2023-05-09 PROCEDURE — 1123F ACP DISCUSS/DSCN MKR DOCD: CPT | Performed by: ORTHOPAEDIC SURGERY

## 2023-05-09 PROCEDURE — G8427 DOCREV CUR MEDS BY ELIG CLIN: HCPCS | Performed by: ORTHOPAEDIC SURGERY

## 2023-05-09 PROCEDURE — G8420 CALC BMI NORM PARAMETERS: HCPCS | Performed by: ORTHOPAEDIC SURGERY

## 2023-05-09 PROCEDURE — G8400 PT W/DXA NO RESULTS DOC: HCPCS | Performed by: ORTHOPAEDIC SURGERY

## 2023-05-09 PROCEDURE — 99213 OFFICE O/P EST LOW 20 MIN: CPT | Performed by: ORTHOPAEDIC SURGERY

## 2023-05-09 PROCEDURE — 1036F TOBACCO NON-USER: CPT | Performed by: ORTHOPAEDIC SURGERY

## 2023-05-09 PROCEDURE — 1090F PRES/ABSN URINE INCON ASSESS: CPT | Performed by: ORTHOPAEDIC SURGERY

## 2023-05-09 RX ORDER — LIDOCAINE 4 G/G
PATCH TOPICAL
Qty: 14 PATCH | Refills: 0 | Status: CANCELLED | OUTPATIENT
Start: 2023-05-09

## 2023-05-09 RX ORDER — LIDOCAINE 4 G/G
PATCH TOPICAL
Qty: 14 PATCH | Refills: 0 | Status: SHIPPED | OUTPATIENT
Start: 2023-05-09

## 2023-05-09 NOTE — PROGRESS NOTES
throbbing sensation in her foot, which she attributes to neuropathy. Notably, she has a complex past medical history. She has a history of GERD, heart disease (CHF, coronary artery disease, history of MI), hypothyroidism, chronic kidney disease (begin dialysis on 4/12/2023), and history of uncontrolled type 2 diabetes with neuropathy. We had a discussion today about the likely diagnosis and its natural history, physical exam and imaging findings, as well as various treatment options in detail. Surgically, we can discussed that no surgical invention is indicated at this time, and I recommended conservative management. Prior to her initial office visit, the patient has tried ice, Tylenol, Biofreeze, Ace wrap, opiates, and a cam boot. Orders/referrals were placed as below at today's visit. She may continue weightbearing as tolerated. I encouraged her to use her custom diabetic orthotic inserts. The patient was ordered topical lidocaine patches. She was also encouraged to follow-up with her PCP regarding potential medical options for neuropathy. No antibiotics indicated at this time. All questions were answered and the above plan was agreed upon. The patient will return to clinic in the future as needed. In the future, we may consider a left foot MRI, and/or double rocker-bottom shoe.              At the patient's next visit, depending on how the patient is doing and/or new imaging/labs results, we may consider the following options:    []  Lace up ankle     []  CAM boot         []  removable wrist brace     []  PT:        []  Wean out immobilization         []  Adv activity      []  Footmind        []  Spenco       []  Custom Orthotic:               []  AZ brace                    []  Rocker Bottom      []  Night splint    []  Heel cups        []  Strap        []  Toe gizmos    []  Topl        []  NSAIDs         []  Vic        []  Ref:         []  Stress Xray    []  CT        []  MRI  []

## 2023-05-10 ENCOUNTER — CARE COORDINATION (OUTPATIENT)
Dept: CARE COORDINATION | Age: 78
End: 2023-05-10

## 2023-05-10 NOTE — CARE COORDINATION
Attempted to contact patient for Care Management update, no answer, lvm to return call to this nurse at [phone]. If no return call, ACM will attempt to contact patient again in a few days.      Future Appointments   Date Time Provider oNlvia Willoughby   6/15/2023  8:45 AM DO TEGAN Morin [facility] None   8/1/2023  2:45 PM MD Mike Morrowhti Mohan OB/Gyn [facility]

## 2023-05-23 ENCOUNTER — CARE COORDINATION (OUTPATIENT)
Dept: CARE COORDINATION | Age: 78
End: 2023-05-23

## 2023-05-23 NOTE — CARE COORDINATION
Attempted to contact patient for Care Management update, no answer, lvm to return call to this nurse at [phone]. If no return call, ACM will attempt to contact patient again in 1-2 days.     Future Appointments   Date Time Provider Nolvia Willoughby   6/15/2023  8:45 AM DO TEGAN Benavides [facility] None   8/1/2023  2:45 PM Kalina Lucas MD Chick Clock OB/Gyn [facility]

## 2023-06-05 ENCOUNTER — TELEPHONE (OUTPATIENT)
Dept: ORTHOPEDIC SURGERY | Age: 78
End: 2023-06-05

## 2023-06-06 ENCOUNTER — TELEPHONE (OUTPATIENT)
Dept: ORTHOPEDIC SURGERY | Age: 78
End: 2023-06-06

## 2023-06-06 NOTE — TELEPHONE ENCOUNTER
Spoke with patients daughter Joe Aguilar, per Dr. Beth Milian he would like to have the patient follow up with her PCP to be followed for treatment of the cellulitis. They will call with any further questions or concerns.

## 2023-06-07 PROBLEM — M17.12 PRIMARY OSTEOARTHRITIS OF LEFT KNEE: Status: ACTIVE | Noted: 2021-09-21

## 2023-06-19 ENCOUNTER — CARE COORDINATION (OUTPATIENT)
Dept: CARE COORDINATION | Age: 78
End: 2023-06-19

## 2023-06-19 NOTE — CARE COORDINATION
Attempted to contact patient for Care Management update, no answer, lvm to return call to this nurse at [phone]. If no return call, ACM will attempt to contact patient again next week.      Future Appointments   Date Time Provider Nolvia Fartun   6/22/2023  9:15 AM DO TEGAN Case [facility] None   8/1/2023  2:45 PM MD Haris Fu OB/Gyn [facility]

## 2023-06-22 ENCOUNTER — HOSPITAL ENCOUNTER (OUTPATIENT)
Age: 78
Discharge: HOME | End: 2023-06-22
Payer: MEDICARE

## 2023-06-22 ENCOUNTER — HOSPITAL ENCOUNTER (OUTPATIENT)
Dept: MRI IMAGING | Age: 78
Discharge: HOME | End: 2023-06-24
Payer: MEDICARE

## 2023-06-22 DIAGNOSIS — M25.572 ACUTE LEFT ANKLE PAIN: ICD-10-CM

## 2023-06-22 DIAGNOSIS — L03.116 CELLULITIS OF LEFT LOWER EXTREMITY: ICD-10-CM

## 2023-06-22 LAB
BASOPHILS # BLD: 0.04 K/UL (ref 0–0.2)
BASOPHILS NFR BLD: 0 % (ref 0–2)
CREAT SERPL-MCNC: 4.43 MG/DL (ref 0.5–0.9)
CRP SERPL HS-MCNC: 5.4 MG/L (ref 0–5)
EOSINOPHIL # BLD: 0.26 K/UL (ref 0–0.44)
EOSINOPHILS RELATIVE PERCENT: 3 % (ref 1–4)
ERYTHROCYTE [DISTWIDTH] IN BLOOD BY AUTOMATED COUNT: 15 % (ref 11.8–14.4)
ERYTHROCYTE [SEDIMENTATION RATE] IN BLOOD BY WESTERGREN METHOD: 12 MM/HR (ref 0–30)
GFR SERPL CREATININE-BSD FRML MDRD: 10 ML/MIN/1.73M2
HCT VFR BLD AUTO: 33.3 % (ref 36.3–47.1)
HGB BLD-MCNC: 10.7 G/DL (ref 11.9–15.1)
IMM GRANULOCYTES # BLD AUTO: 0.03 K/UL (ref 0–0.3)
IMM GRANULOCYTES NFR BLD: 0 %
LYMPHOCYTES # BLD: 29 % (ref 24–43)
LYMPHOCYTES NFR BLD: 2.67 K/UL (ref 1.1–3.7)
MCH RBC QN AUTO: 31.2 PG (ref 25.2–33.5)
MCHC RBC AUTO-ENTMCNC: 32.1 G/DL (ref 28.4–34.8)
MCV RBC AUTO: 97.1 FL (ref 82.6–102.9)
MONOCYTES NFR BLD: 0.82 K/UL (ref 0.1–1.2)
MONOCYTES NFR BLD: 9 % (ref 3–12)
NEUTROPHILS NFR BLD: 58 % (ref 36–65)
NEUTS SEG NFR BLD: 5.28 K/UL (ref 1.5–8.1)
NRBC AUTOMATED: 0 PER 100 WBC
PLATELET # BLD AUTO: ABNORMAL K/UL (ref 138–453)
PLATELET, FLUORESCENCE: 229 K/UL (ref 138–453)
PLATELETS.RETICULATED NFR BLD AUTO: 2.8 % (ref 1.1–10.3)
RBC # BLD AUTO: 3.43 M/UL (ref 3.95–5.11)
RBC # BLD: ABNORMAL 10*6/UL
WBC OTHER # BLD: 9.1 K/UL (ref 3.5–11.3)

## 2023-06-22 PROCEDURE — 82565 ASSAY OF CREATININE: CPT

## 2023-06-22 PROCEDURE — 36415 COLL VENOUS BLD VENIPUNCTURE: CPT

## 2023-06-22 PROCEDURE — 86140 C-REACTIVE PROTEIN: CPT

## 2023-06-22 PROCEDURE — 85652 RBC SED RATE AUTOMATED: CPT

## 2023-06-22 PROCEDURE — 73721 MRI JNT OF LWR EXTRE W/O DYE: CPT

## 2023-06-22 PROCEDURE — 85027 COMPLETE CBC AUTOMATED: CPT

## 2023-06-22 PROCEDURE — 85055 RETICULATED PLATELET ASSAY: CPT

## 2023-07-26 PROBLEM — N18.6 TYPE 2 DIABETES MELLITUS WITH CHRONIC KIDNEY DISEASE ON CHRONIC DIALYSIS, WITH LONG-TERM CURRENT USE OF INSULIN (HCC): Status: ACTIVE | Noted: 2021-08-11

## 2023-07-26 PROBLEM — Z79.4 TYPE 2 DIABETES MELLITUS WITH CHRONIC KIDNEY DISEASE ON CHRONIC DIALYSIS, WITH LONG-TERM CURRENT USE OF INSULIN (HCC): Status: ACTIVE | Noted: 2021-08-11

## 2023-07-26 PROBLEM — Z99.2 TYPE 2 DIABETES MELLITUS WITH CHRONIC KIDNEY DISEASE ON CHRONIC DIALYSIS, WITH LONG-TERM CURRENT USE OF INSULIN (HCC): Status: ACTIVE | Noted: 2021-08-11

## 2023-07-26 PROBLEM — E11.22 TYPE 2 DIABETES MELLITUS WITH CHRONIC KIDNEY DISEASE ON CHRONIC DIALYSIS, WITH LONG-TERM CURRENT USE OF INSULIN (HCC): Status: ACTIVE | Noted: 2021-08-11

## 2023-08-02 ENCOUNTER — CARE COORDINATION (OUTPATIENT)
Dept: CARE COORDINATION | Age: 78
End: 2023-08-02

## 2023-08-02 NOTE — CARE COORDINATION
Attempted to contact patient for Care Management update, no answer, lvm to return call to this nurse at [phone]. If no return call, ACM will attempt to contact patient again in a few days.     Future Appointments   Date Time Provider [address]   8/8/2023  7:30 AM Chi Castro MD Sylv OB[facility]   9/6/2023  9:15 AM JIM Morales - CNP [facility] None

## 2023-08-08 ENCOUNTER — OFFICE VISIT (OUTPATIENT)
Dept: OBGYN CLINIC | Age: 78
End: 2023-08-08
Payer: MEDICARE

## 2023-08-08 ENCOUNTER — CARE COORDINATION (OUTPATIENT)
Dept: CARE COORDINATION | Age: 78
End: 2023-08-08

## 2023-08-08 VITALS
HEIGHT: 65 IN | SYSTOLIC BLOOD PRESSURE: 140 MMHG | DIASTOLIC BLOOD PRESSURE: 61 MMHG | WEIGHT: 144 LBS | HEART RATE: 67 BPM | BODY MASS INDEX: 23.99 KG/M2

## 2023-08-08 DIAGNOSIS — Z46.89 PESSARY MAINTENANCE: Primary | ICD-10-CM

## 2023-08-08 DIAGNOSIS — N81.4 UTERINE PROLAPSE: ICD-10-CM

## 2023-08-08 DIAGNOSIS — N81.10 PROLAPSE OF ANTERIOR VAGINAL WALL: ICD-10-CM

## 2023-08-08 PROCEDURE — G8400 PT W/DXA NO RESULTS DOC: HCPCS | Performed by: OBSTETRICS & GYNECOLOGY

## 2023-08-08 PROCEDURE — 1036F TOBACCO NON-USER: CPT | Performed by: OBSTETRICS & GYNECOLOGY

## 2023-08-08 PROCEDURE — G8427 DOCREV CUR MEDS BY ELIG CLIN: HCPCS | Performed by: OBSTETRICS & GYNECOLOGY

## 2023-08-08 PROCEDURE — 1090F PRES/ABSN URINE INCON ASSESS: CPT | Performed by: OBSTETRICS & GYNECOLOGY

## 2023-08-08 PROCEDURE — 99213 OFFICE O/P EST LOW 20 MIN: CPT | Performed by: OBSTETRICS & GYNECOLOGY

## 2023-08-08 PROCEDURE — G8420 CALC BMI NORM PARAMETERS: HCPCS | Performed by: OBSTETRICS & GYNECOLOGY

## 2023-08-08 PROCEDURE — 1123F ACP DISCUSS/DSCN MKR DOCD: CPT | Performed by: OBSTETRICS & GYNECOLOGY

## 2023-08-08 ASSESSMENT — ENCOUNTER SYMPTOMS
BACK PAIN: 1
COUGH: 0
ABDOMINAL PAIN: 0
SHORTNESS OF BREATH: 0

## 2023-08-08 NOTE — CARE COORDINATION
Attempted to contact patient for Care Management update, no answer, lvm to return call to this nurse at [phone]. If no return call, ACM will attempt to contact patient again in a few days.      Future Appointments   Date Time Provider [address]   9/6/2023  9:15 AM JIM Arora - CNP [facility] None   11/7/2023  8:15 AM MD Eunice Manzo OB/Gyn [facility]
Schedule apt for med adjust
11/7/2023  8:15 AM MD Jaki Bryant Fess OB/Gyn [facility]

## 2023-08-08 NOTE — PROGRESS NOTES
Chaperone for Intimate Exam  Chaperone was offered as part of the rooming process. Patient declined and agrees to continue with exam without a chaperone.
blood sugars) 3 Adjustable Dose Pre-filled Pen Syringe 2    furosemide (LASIX) 80 MG tablet Take 1 tablet by mouth daily      famotidine (PEPCID) 20 MG tablet TAKE 1 TABLET BY MOUTH  TWICE DAILY 180 tablet 3    rosuvastatin (CRESTOR) 10 MG tablet TAKE 1 TABLET BY MOUTH  DAILY 90 tablet 3    vitamin D (ERGOCALCIFEROL) 1.25 MG (76058 UT) CAPS capsule       carvedilol (COREG) 25 MG tablet TAKE 1 TABLET BY MOUTH  TWICE DAILY WITH MEALS 180 tablet 3    blood glucose test strips (ONETOUCH ULTRA) strip TEST 3 TIMES DAILY AND AS  NEEDED FOR SYMPTOMS OF  IRREGULAR BLOOD GLUCOSE 300 strip 3    gabapentin (NEURONTIN) 300 MG capsule Take 1 capsule by mouth 2 times daily. loperamide (IMODIUM) 2 MG capsule Take 1 capsule by mouth 4 times daily as needed for Diarrhea      Handicap Placard MISC by Does not apply route EXPIRES IN 5 YEARS FROM ORIGINAL SCRIPT DATE 1 each 0    aspirin 81 MG tablet Take 1 tablet by mouth daily       No current facility-administered medications on file prior to visit. Past Surgical History:   Procedure Laterality Date    BACK SURGERY      Lumbar herniated disc    CARDIAC SURGERY      CABG x4    CARPAL TUNNEL RELEASE      CATARACT REMOVAL      CORONARY ARTERY BYPASS GRAFT  04/13/2021    x4, Dr. Hong Hair History     Socioeconomic History    Marital status:       Spouse name: Not on file    Number of children: Not on file    Years of education: Not on file    Highest education level: Not on file   Occupational History    Not on file   Tobacco Use    Smoking status: Never    Smokeless tobacco: Never   Substance and Sexual Activity    Alcohol use: No    Drug use: No    Sexual activity: Not Currently   Other Topics Concern    Not on file   Social History Narrative    Not on file     Social Determinants of Health     Financial Resource Strain: Low Risk     Difficulty of Paying Living Expenses: Not hard at all   Food Insecurity: No Food

## 2023-08-09 ENCOUNTER — HOSPITAL ENCOUNTER (EMERGENCY)
Facility: CLINIC | Age: 78
Discharge: HOME | End: 2023-08-10
Attending: EMERGENCY MEDICINE
Payer: MEDICARE

## 2023-08-09 DIAGNOSIS — E11.65 HYPERGLYCEMIA DUE TO DIABETES MELLITUS (HCC): Primary | ICD-10-CM

## 2023-08-09 LAB
ANION GAP SERPL CALCULATED.3IONS-SCNC: 12 MMOL/L (ref 9–17)
BUN SERPL-MCNC: 30 MG/DL (ref 8–23)
CALCIUM SERPL-MCNC: 9.3 MG/DL (ref 8.6–10.4)
CHLORIDE SERPL-SCNC: 94 MMOL/L (ref 98–107)
CHP ED QC CHECK: 357
CO2 SERPL-SCNC: 30 MMOL/L (ref 20–31)
CREAT SERPL-MCNC: 2.5 MG/DL (ref 0.5–0.9)
GFR SERPL CREATININE-BSD FRML MDRD: 19 ML/MIN/1.73M2
GLUCOSE SERPL-MCNC: 357 MG/DL (ref 70–99)
METER GLUCOSE: 357 MG/DL (ref 65–105)
POTASSIUM SERPL-SCNC: 4 MMOL/L (ref 3.7–5.3)
SODIUM SERPL-SCNC: 136 MMOL/L (ref 135–144)

## 2023-08-09 PROCEDURE — 82947 ASSAY GLUCOSE BLOOD QUANT: CPT

## 2023-08-09 PROCEDURE — 80048 BASIC METABOLIC PNL TOTAL CA: CPT

## 2023-08-09 PROCEDURE — 6370000000 HC RX 637 (ALT 250 FOR IP): Performed by: EMERGENCY MEDICINE

## 2023-08-09 PROCEDURE — 36415 COLL VENOUS BLD VENIPUNCTURE: CPT

## 2023-08-09 PROCEDURE — 96372 THER/PROPH/DIAG INJ SC/IM: CPT

## 2023-08-09 PROCEDURE — 99284 EMERGENCY DEPT VISIT MOD MDM: CPT

## 2023-08-09 RX ADMIN — INSULIN HUMAN 5 UNITS: 100 INJECTION, SOLUTION PARENTERAL at 23:04

## 2023-08-09 ASSESSMENT — PAIN - FUNCTIONAL ASSESSMENT: PAIN_FUNCTIONAL_ASSESSMENT: NONE - DENIES PAIN

## 2023-08-10 ENCOUNTER — CARE COORDINATION (OUTPATIENT)
Dept: CARE COORDINATION | Age: 78
End: 2023-08-10

## 2023-08-10 VITALS
WEIGHT: 144 LBS | OXYGEN SATURATION: 98 % | HEIGHT: 65 IN | SYSTOLIC BLOOD PRESSURE: 146 MMHG | RESPIRATION RATE: 15 BRPM | BODY MASS INDEX: 23.99 KG/M2 | HEART RATE: 60 BPM | DIASTOLIC BLOOD PRESSURE: 66 MMHG | TEMPERATURE: 98.3 F

## 2023-08-10 LAB — METER GLUCOSE: 314 MG/DL (ref 65–105)

## 2023-08-10 PROCEDURE — 82947 ASSAY GLUCOSE BLOOD QUANT: CPT

## 2023-08-10 NOTE — ED PROVIDER NOTES
eMERGENCY dEPARTMENT eNCOUnter      Pt Name: Garret Rojas  MRN: [id number]  [address] [facility] [place] 1945  Date of evaluation: 8/9/2023      CHIEF COMPLAINT       Chief Complaint   Patient presents with    Hyperglycemia     X1 DAY         HISTORY OF PRESENT ILLNESS    Garret Rojas is a 78 y.o. female who presents with high blood sugars. Patient has no symptoms she states that her old meter probably was not working very well and a couple weeks ago she had a bout of shingles she came in and was started on medication shortly after that her sugar dropped very low and they took her off of her pill says since that time her sugars been running mostly in 200s. She was at dialysis today they checked her sugar and found it to be markedly elevated at she got a new meter today noticed that her sugars been running high she is here for evaluation she denies any chest pain shortness of breath fever chills denies any nausea or vomiting        REVIEW OF SYSTEMS       Review of systems are all reviewed and negative except stated above in the HPI    [address]    has a past medical history of CRF (chronic renal failure), Diabetes mellitus ([address]), Gout, Heart attack ([address]), Hyperlipidemia, Hypertension, and Thyroid disease. SURGICAL HISTORY      has a past surgical history that includes back surgery; Carpal tunnel release; Foot surgery; lipoma resection; Cataract removal; Coronary artery bypass graft (04/13/2021); and Cardiac surgery.     CURRENT MEDICATIONS       Discharge Medication List as of 8/10/2023 12:18 AM        CONTINUE these medications which have NOT CHANGED    Details   levothyroxine (SYNTHROID) 100 MCG tablet TAKE 1 TABLET BY MOUTH  DAILY, Disp-90 tablet, R-3Requesting 1 year supplyNormal      Continuous Blood Gluc Sensor (DEXCOM G7 SENSOR) MISC 1 application by Does not apply route every 14 days, Disp-12 each, R-1Normal      hydrALAZINE (APRESOLINE) 25 MG tablet Take 1 tablet by mouth 3 times daily, Disp-90

## 2023-08-10 NOTE — ED NOTES
[facility] [address] [facility] Nakul Fatima RN  08/09/23 [id number]

## 2023-08-10 NOTE — CARE COORDINATION
Ambulatory Care Coordination  ED Follow up Call    Reason for ED visit:  Hyperglycemia   Status:     improved    Did you call your PCP prior to going to the ED? Yes      Did you receive a discharge instructions from the Emergency Room? Yes  Review of Instructions:     Understands what to report/when to return?:  Yes   Understands discharge instructions?:  Yes   Following discharge instructions?:  Yes   If not why? Are there any new complaints of pain? No  New Pain Meds? No    Constipation prophylaxis needed? N/A    If you have a wound is the dressing clean, dry, and intact? N/A  Understands wound care regimen? N/A    Are there any other complaints/concerns that you wish to tell your provider? Challenges to be reviewed by the provider   Additional needs identified to be addressed with provider Yes  Hyperglycemia - scheduled appt for tomorrow. FU appts/Provider:    Future Appointments   Date Time Provider [address]   8/11/2023  9:45 AM JIM Bassett CNP [facility] None   9/6/2023  9:15 AM JIM Bassett CNP [facility] None   11/7/2023  8:15 AM MD Jorge Alberto Kimball OB/Gyn [facility]           New Medications?:   No      Medication Reconciliation by phone - Yes  Understands Medications? Yes  Taking Medications? Yes  Can you swallow your pills? Yes    Any further needs in the home i.e. Equipment? No    Link to services in community?:  No   Which services:      Patient states her blood sugars were high all day yesterday, using new Dexcom monitoring. Denies having any symptoms with elevated BS. Patient asked what should she do? Start taking the sugar pill again? ACM scheduled f/u visit for tomorrow after her dialysis. States BS today is 230. ACM will follow up next week to f/u on office visit.

## 2023-08-15 ENCOUNTER — CARE COORDINATION (OUTPATIENT)
Dept: CARE COORDINATION | Age: 78
End: 2023-08-15

## 2023-08-15 NOTE — CARE COORDINATION
Attempted to contact patient for Care Management update, no answer, lvm to return call to this nurse at [phone]. If no return call, ACM will attempt to contact patient again in a few days.     Future Appointments   Date Time Provider [address]   9/6/2023  9:30 AM JIM Salazar - CNP [facility] None   11/7/2023  8:15 AM MD Ross Santiago OB/Gyn [facility]

## 2023-08-17 ENCOUNTER — CARE COORDINATION (OUTPATIENT)
Dept: CARE COORDINATION | Age: 78
End: 2023-08-17

## 2023-08-17 NOTE — CARE COORDINATION
Attempted to contact patient for Care Management update, no answer, lvm to return call to this nurse at [phone]. If no return call, ACM will attempt to contact patient again in a few days.      Future Appointments   Date Time Provider [address]   9/6/2023  9:30 AM JIM Mina - CNP [facility] None   11/7/2023  8:15 AM MD Bandar Morgan OB/Gyn [facility]

## 2023-08-24 ENCOUNTER — CARE COORDINATION (OUTPATIENT)
Dept: CARE COORDINATION | Age: 78
End: 2023-08-24

## 2023-08-24 NOTE — CARE COORDINATION
Attempted to contact patient for Care Management update, no answer, lvm to return call to this nurse at [phone]. If no return call, ACM will attempt to contact patient again next week.     Future Appointments   Date Time Provider [address]   9/6/2023  9:30 AM JIM Salas - CNP [facility] None   11/7/2023  8:15 AM Deborah Mcguire MD [facility] OB/Gyn [facility]

## 2023-10-02 ENCOUNTER — CARE COORDINATION (OUTPATIENT)
Dept: CARE COORDINATION | Age: 78
End: 2023-10-02

## 2023-10-02 SDOH — ECONOMIC STABILITY: HOUSING INSECURITY: IN THE LAST 12 MONTHS, HOW MANY PLACES HAVE YOU LIVED?: 1

## 2023-10-02 SDOH — ECONOMIC STABILITY: INCOME INSECURITY: IN THE LAST 12 MONTHS, WAS THERE A TIME WHEN YOU WERE NOT ABLE TO PAY THE MORTGAGE OR RENT ON TIME?: NO

## 2023-10-02 SDOH — HEALTH STABILITY: PHYSICAL HEALTH: ON AVERAGE, HOW MANY MINUTES DO YOU ENGAGE IN EXERCISE AT THIS LEVEL?: 0 MIN

## 2023-10-02 SDOH — ECONOMIC STABILITY: TRANSPORTATION INSECURITY
IN THE PAST 12 MONTHS, HAS THE LACK OF TRANSPORTATION KEPT YOU FROM MEDICAL APPOINTMENTS OR FROM GETTING MEDICATIONS?: NO

## 2023-10-02 SDOH — ECONOMIC STABILITY: TRANSPORTATION INSECURITY
IN THE PAST 12 MONTHS, HAS LACK OF TRANSPORTATION KEPT YOU FROM MEETINGS, WORK, OR FROM GETTING THINGS NEEDED FOR DAILY LIVING?: NO

## 2023-10-02 SDOH — ECONOMIC STABILITY: HOUSING INSECURITY
IN THE LAST 12 MONTHS, WAS THERE A TIME WHEN YOU DID NOT HAVE A STEADY PLACE TO SLEEP OR SLEPT IN A SHELTER (INCLUDING NOW)?: NO

## 2023-10-02 SDOH — HEALTH STABILITY: PHYSICAL HEALTH: ON AVERAGE, HOW MANY DAYS PER WEEK DO YOU ENGAGE IN MODERATE TO STRENUOUS EXERCISE (LIKE A BRISK WALK)?: 0 DAYS

## 2023-10-02 ASSESSMENT — SOCIAL DETERMINANTS OF HEALTH (SDOH)
HOW OFTEN DO YOU GET TOGETHER WITH FRIENDS OR RELATIVES?: TWICE A WEEK
DO YOU BELONG TO ANY CLUBS OR ORGANIZATIONS SUCH AS CHURCH GROUPS UNIONS, FRATERNAL OR ATHLETIC GROUPS, OR SCHOOL GROUPS?: NO
HOW OFTEN DO YOU ATTENT MEETINGS OF THE CLUB OR ORGANIZATION YOU BELONG TO?: NEVER
HOW OFTEN DO YOU ATTEND CHURCH OR RELIGIOUS SERVICES?: NEVER
IN A TYPICAL WEEK, HOW MANY TIMES DO YOU TALK ON THE PHONE WITH FAMILY, FRIENDS, OR NEIGHBORS?: MORE THAN THREE TIMES A WEEK

## 2023-10-02 ASSESSMENT — LIFESTYLE VARIABLES
HOW OFTEN DO YOU HAVE A DRINK CONTAINING ALCOHOL: NEVER
HOW MANY STANDARD DRINKS CONTAINING ALCOHOL DO YOU HAVE ON A TYPICAL DAY: PATIENT DOES NOT DRINK

## 2023-10-02 NOTE — CARE COORDINATION
Patient has graduated from the Complex Case Management  program on 10/2/2023. Patient/family has the ability to self-manage at this time. Care management goals have been completed. No further Ambulatory Care Manager follow up scheduled. Goals Addressed                   This Visit's Progress     COMPLETED: Conditions and Symptoms   On track     I will schedule office visits, as directed by my provider. I will keep my appointment or reschedule if I have to cancel. I will notify my provider of any barriers to my plan of care. I will follow my Zone Management tool to seek urgent or emergent care. I will notify my provider of any symptoms that indicate a worsening of my condition. Barriers: overwhelmed by complexity of regimen and lack of education  Plan for overcoming my barriers: will work with ACM  Confidence: 9/10  Anticipated Goal Completion Date: 2023                Patient has Ambulatory Care Manager's contact information for any further questions, concerns, or needs. Patients upcoming visits:    Future Appointments   Date Time Provider [address]   2023  8:15 AM Lopez Dalton MD Sylv OB/G[facility]   3/6/2024  9:15 AM JIM Gurrola - CNP [facility] None            Ambulatory Care Coordination Note  10/2/2023    Patient Current Location:  Home: [address] [place] [id number]     ACM contacted the patient by telephone. Verified name and  with patient as identifiers. Provided introduction to self, and explanation of the ACM role. Challenges to be reviewed by the provider   Additional needs identified to be addressed with provider: No  none               Method of communication with provider: chart routing. ACM: Hong Brice RN    Spoke to patient who states she is doing good, just little tired after dialysis this morning.    DM: pt is using CGM monitoring, is now seeing Endo for insulin management and last saw Endo on 2023, she is to take

## 2023-11-28 ENCOUNTER — HOSPITAL ENCOUNTER (OUTPATIENT)
Age: 78
Setting detail: SPECIMEN
Discharge: HOME | End: 2023-11-28

## 2023-11-28 DIAGNOSIS — N30.01 ACUTE CYSTITIS WITH HEMATURIA: ICD-10-CM

## 2023-11-30 LAB
MICROORGANISM SPEC CULT: ABNORMAL
SPECIMEN DESCRIPTION: ABNORMAL

## 2023-12-29 ENCOUNTER — OFFICE VISIT (OUTPATIENT)
Dept: OBGYN CLINIC | Age: 78
End: 2023-12-29
Payer: MEDICARE

## 2023-12-29 VITALS — BODY MASS INDEX: 24.33 KG/M2 | WEIGHT: 146.2 LBS

## 2023-12-29 DIAGNOSIS — Z46.89 ENCOUNTER FOR PESSARY MAINTENANCE: Primary | ICD-10-CM

## 2023-12-29 PROCEDURE — G8420 CALC BMI NORM PARAMETERS: HCPCS | Performed by: OBSTETRICS & GYNECOLOGY

## 2023-12-29 PROCEDURE — 1123F ACP DISCUSS/DSCN MKR DOCD: CPT | Performed by: OBSTETRICS & GYNECOLOGY

## 2023-12-29 PROCEDURE — G8428 CUR MEDS NOT DOCUMENT: HCPCS | Performed by: OBSTETRICS & GYNECOLOGY

## 2023-12-29 PROCEDURE — 1036F TOBACCO NON-USER: CPT | Performed by: OBSTETRICS & GYNECOLOGY

## 2023-12-29 PROCEDURE — 99213 OFFICE O/P EST LOW 20 MIN: CPT | Performed by: OBSTETRICS & GYNECOLOGY

## 2023-12-29 PROCEDURE — G8484 FLU IMMUNIZE NO ADMIN: HCPCS | Performed by: OBSTETRICS & GYNECOLOGY

## 2023-12-29 PROCEDURE — G8400 PT W/DXA NO RESULTS DOC: HCPCS | Performed by: OBSTETRICS & GYNECOLOGY

## 2023-12-29 PROCEDURE — 1090F PRES/ABSN URINE INCON ASSESS: CPT | Performed by: OBSTETRICS & GYNECOLOGY

## 2023-12-29 NOTE — PROGRESS NOTES
[facility]  [facility] - [facility] Gokul Brown  Dept: [phone]    Chief Complaint   Patient presents with    Follow-up     Pessary cleaning        Aurea Anguiano is an 79 yo female who presents for pessary maintenance. She was last here 3 months ago. She says she is doing well and not having any GYN problems. She just came from dialysis and felt a little off today. She denies any vaginal bleeding or abnormal vaginal discharge. She is still happy with her pessary. Review of Systems   Constitutional:  Negative for chills and fever. HENT:  Negative for congestion. Respiratory:  Negative for cough and shortness of breath. Cardiovascular:  Negative for chest pain and palpitations. Gastrointestinal:  Negative for abdominal pain. Genitourinary:  Negative for pelvic pain and vaginal discharge. Musculoskeletal:  Negative for back pain. Neurological:  Negative for dizziness and light-headedness. Psychiatric/Behavioral:  The patient is not nervous/anxious.         Past Medical History:   Diagnosis Date    CRF (chronic renal failure)     Diabetes mellitus ([facility])     Gout     Heart attack ([facility]) 02/2021    Hyperlipidemia     Hypertension     Thyroid disease        Current Outpatient Medications on File Prior to Visit   Medication Sig Dispense Refill    carvedilol (COREG) 25 MG tablet TAKE 1 TABLET BY MOUTH TWICE  DAILY WITH MEALS 180 tablet 3    ONETOUCH ULTRA strip TEST 3 TIMES DAILY AND AS  NEEDED FOR SYMPTOMS OF  IRREGULAR BLOOD GLUCOSE 300 strip 3    levothyroxine (SYNTHROID) 75 MCG tablet 1 tablet      Insulin Degludec (TRESIBA FLEXTOUCH) 100 UNIT/ML SOPN Inject 22 Units into the skin daily      Insulin Lispro-aabc, 1 U Dial, (LYUMJEV KWIKPEN) 100 UNIT/ML SOPN Inject into the skin Sliding scale per endo      hydrALAZINE (APRESOLINE) 25 MG tablet Take 1 tablet by mouth 3 times daily (Patient not taking:

## 2024-03-08 PROBLEM — Z95.1 HISTORY OF CORONARY ARTERY BYPASS GRAFT: Status: ACTIVE | Noted: 2024-02-13

## 2024-03-08 PROBLEM — N18.6 ESRD (END STAGE RENAL DISEASE) (HCC): Status: ACTIVE | Noted: 2024-02-13

## 2024-03-28 NOTE — PROGRESS NOTES
Needs: No Transportation Needs (10/2/2023)    PRAPARE - Transportation     Lack of Transportation (Medical): No     Lack of Transportation (Non-Medical): No   Physical Activity: Inactive (10/2/2023)    Exercise Vital Sign     Days of Exercise per Week: 0 days     Minutes of Exercise per Session: 0 min   Stress: Not on file   Social Connections: Socially Isolated (10/2/2023)    Social Connection and Isolation Panel [NHANES]     Frequency of Communication with Friends and Family: More than three times a week     Frequency of Social Gatherings with Friends and Family: Twice a week     Attends Voodoo Services: Never     Active Member of Clubs or Organizations: No     Attends Club or Organization Meetings: Never     Marital Status:    Intimate Partner Violence: Not on file   Housing Stability: Low Risk  (10/2/2023)    Housing Stability Vital Sign     Unable to Pay for Housing in the Last Year: No     Number of Places Lived in the Last Year: 1     Unstable Housing in the Last Year: No       O:Blood pressure (!) 157/64, pulse 59, height 1.651 m (5' 5\"), weight 69.4 kg (153 lb), not currently breastfeeding.  Gen: alert, no apparent distress  Pelvic:  There is not any signs of infection; The vaginal vault is without any signs of erythema or erosion. There is no vaginal discharge or odor appreciated.    Pessary removed easily and cleansed with water and soap.  Pessary replaced without difficulty.  T.O.S. Ointment was placed with the pessary to decreasedischarge/odor.    A/P:   Diagnosis Orders   1. Encounter for pessary maintenance            Pt to follow up in 3-4 months  [facility] [place]

## 2024-03-29 ENCOUNTER — OFFICE VISIT (OUTPATIENT)
Dept: OBGYN CLINIC | Age: 79
End: 2024-03-29
Payer: MEDICARE

## 2024-03-29 VITALS
WEIGHT: 153 LBS | HEIGHT: 65 IN | BODY MASS INDEX: 25.49 KG/M2 | SYSTOLIC BLOOD PRESSURE: 157 MMHG | HEART RATE: 59 BPM | DIASTOLIC BLOOD PRESSURE: 64 MMHG

## 2024-03-29 DIAGNOSIS — Z46.89 ENCOUNTER FOR PESSARY MAINTENANCE: Primary | ICD-10-CM

## 2024-03-29 PROCEDURE — G8427 DOCREV CUR MEDS BY ELIG CLIN: HCPCS | Performed by: OBSTETRICS & GYNECOLOGY

## 2024-03-29 PROCEDURE — 1123F ACP DISCUSS/DSCN MKR DOCD: CPT | Performed by: OBSTETRICS & GYNECOLOGY

## 2024-03-29 PROCEDURE — G8484 FLU IMMUNIZE NO ADMIN: HCPCS | Performed by: OBSTETRICS & GYNECOLOGY

## 2024-03-29 PROCEDURE — 99213 OFFICE O/P EST LOW 20 MIN: CPT | Performed by: OBSTETRICS & GYNECOLOGY

## 2024-03-29 PROCEDURE — 1036F TOBACCO NON-USER: CPT | Performed by: OBSTETRICS & GYNECOLOGY

## 2024-03-29 PROCEDURE — G8400 PT W/DXA NO RESULTS DOC: HCPCS | Performed by: OBSTETRICS & GYNECOLOGY

## 2024-03-29 PROCEDURE — 1090F PRES/ABSN URINE INCON ASSESS: CPT | Performed by: OBSTETRICS & GYNECOLOGY

## 2024-03-29 PROCEDURE — G8419 CALC BMI OUT NRM PARAM NOF/U: HCPCS | Performed by: OBSTETRICS & GYNECOLOGY

## 2024-03-29 ASSESSMENT — ENCOUNTER SYMPTOMS
SHORTNESS OF BREATH: 0
BACK PAIN: 0
COUGH: 0
ABDOMINAL PAIN: 0

## 2024-06-26 NOTE — PROGRESS NOTES
hard at all   Food Insecurity: Not on file (3/15/2023)   Transportation Needs: No Transportation Needs (10/2/2023)    PRAPARE - Transportation     Lack of Transportation (Medical): No     Lack of Transportation (Non-Medical): No   Physical Activity: Inactive (10/2/2023)    Exercise Vital Sign     Days of Exercise per Week: 0 days     Minutes of Exercise per Session: 0 min   Stress: Not on file   Social Connections: Socially Isolated (10/2/2023)    Social Connection and Isolation Panel [NHANES]     Frequency of Communication with Friends and Family: More than three times a week     Frequency of Social Gatherings with Friends and Family: Twice a week     Attends Pentecostalism Services: Never     Active Member of Clubs or Organizations: No     Attends Club or Organization Meetings: Never     Marital Status:    Intimate Partner Violence: Not on file   Housing Stability: Low Risk  (10/2/2023)    Housing Stability Vital Sign     Unable to Pay for Housing in the Last Year: No     Number of Places Lived in the Last Year: 1     Unstable Housing in the Last Year: No       O:Blood pressure (!) 157/81, height 1.651 m (5' 5\"), weight 72.6 kg (160 lb), not currently breastfeeding.  Gen: alert, no apparent distress  Pelvic:  There is not any signs of infection; The vaginal vault is without any signs of erythema or erosion. There is no vaginal discharge or odor appreciated.    Pessary removed easily and cleansed with water and soap.  Pessary replaced without difficulty.  T.O.S. Ointment was placed with the pessary to decreasedischarge/odor.    A/P:   Diagnosis Orders   1. Encounter for pessary maintenance            Pt to follow up in 3 months  [facility] [place]

## 2024-06-28 ENCOUNTER — OFFICE VISIT (OUTPATIENT)
Dept: OBGYN CLINIC | Age: 79
End: 2024-06-28
Payer: MEDICARE

## 2024-06-28 VITALS
DIASTOLIC BLOOD PRESSURE: 81 MMHG | WEIGHT: 160 LBS | SYSTOLIC BLOOD PRESSURE: 157 MMHG | BODY MASS INDEX: 26.66 KG/M2 | HEIGHT: 65 IN

## 2024-06-28 DIAGNOSIS — Z46.89 ENCOUNTER FOR PESSARY MAINTENANCE: Primary | ICD-10-CM

## 2024-06-28 PROCEDURE — G8427 DOCREV CUR MEDS BY ELIG CLIN: HCPCS | Performed by: OBSTETRICS & GYNECOLOGY

## 2024-06-28 PROCEDURE — G8419 CALC BMI OUT NRM PARAM NOF/U: HCPCS | Performed by: OBSTETRICS & GYNECOLOGY

## 2024-06-28 PROCEDURE — G8400 PT W/DXA NO RESULTS DOC: HCPCS | Performed by: OBSTETRICS & GYNECOLOGY

## 2024-06-28 PROCEDURE — 1090F PRES/ABSN URINE INCON ASSESS: CPT | Performed by: OBSTETRICS & GYNECOLOGY

## 2024-06-28 PROCEDURE — 1036F TOBACCO NON-USER: CPT | Performed by: OBSTETRICS & GYNECOLOGY

## 2024-06-28 PROCEDURE — 1123F ACP DISCUSS/DSCN MKR DOCD: CPT | Performed by: OBSTETRICS & GYNECOLOGY

## 2024-06-28 PROCEDURE — 99212 OFFICE O/P EST SF 10 MIN: CPT | Performed by: OBSTETRICS & GYNECOLOGY

## 2024-06-28 ASSESSMENT — ENCOUNTER SYMPTOMS
BACK PAIN: 0
ABDOMINAL PAIN: 0
COUGH: 0
SHORTNESS OF BREATH: 0

## 2024-09-05 NOTE — PROGRESS NOTES
Subjective:      Patient ID: Ying Alexandra is being seen today for   Chief Complaint   Patient presents with   Darren Establish Care     Pessary Maintenance       HPI-   Here to establish care and for pessary maintenance. Had to change providers d/t an insurance issue. Had been seeing her gyn for pessary maintenance every other month. Had bypass surgery in April and lost about 40# since then. Questioning if she needed a size change in current pessary, but denies increase in incontinence or discomfort. Mammogram in 2019. Ordered one today and encouraged to do SBE. No other issues or concerns. 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 Has 6 children- 1 . Has 4 daughters and one son. Review of Systems   Constitutional: Negative for appetite change and fatigue. HENT: Negative for congestion and hearing loss. Eyes: Negative for visual disturbance. Respiratory: Negative for cough and shortness of breath. Cardiovascular: Negative for chest pain and palpitations. Gastrointestinal: Negative for abdominal distention, abdominal pain, constipation and diarrhea. Genitourinary: Negative for flank pain, frequency, menstrual problem, pelvic pain and vaginal discharge. Musculoskeletal: Negative for back pain. Neurological: Negative for syncope and headaches. Psychiatric/Behavioral: Negative for behavioral problems. Vitals:    21 0900   Weight: 148 lb (67.1 kg)   Height: 5' 5\" (1.651 m)     Current Outpatient Medications   Medication Sig Dispense Refill    gabapentin (NEURONTIN) 300 MG capsule Take 300 mg by mouth 3 times daily.       rosuvastatin (CRESTOR) 10 MG tablet TAKE 1 TABLET BY MOUTH  DAILY 90 tablet 1    carvedilol (COREG) 25 MG tablet TAKE 1 TABLET BY MOUTH  TWICE DAILY WITH MEALS 180 tablet 1    hydrALAZINE (APRESOLINE) 50 MG tablet TAKE 1 TABLET BY MOUTH 3  TIMES DAILY 270 tablet 3    glipiZIDE (GLUCOTROL XL) 10 MG extended release tablet TAKE 1 TABLET BY MOUTH IN  THE MORNING AND 1 TABLET ONCE NIGHTLY 180 tablet 0    furosemide (LASIX) 40 MG tablet Take 40 mg by mouth 2 times daily (before meals)      levothyroxine (SYNTHROID) 100 MCG tablet Take 1 tablet by mouth Daily 90 tablet 1    VICTOZA 18 MG/3ML SOPN SC injection INJECT SUBCUTANEOUSLY 0.6MG DAILY 9 mL 3    aspirin 81 MG tablet Take 81 mg by mouth daily      famotidine (PEPCID) 20 MG tablet Take 1 tablet by mouth 2 times daily 180 tablet 1    loperamide (IMODIUM) 2 MG capsule Take 2 mg by mouth 4 times daily as needed for Diarrhea      blood glucose monitor strips Test 3 times a day & as needed for symptoms of irregular blood glucose. 100 strip 2    Handicap Placard MISC by Does not apply route EXPIRES IN 5 YEARS FROM ORIGINAL SCRIPT DATE 1 each 0    Blood Glucose Monitoring Suppl (ONE TOUCH ULTRA 2) w/Device KIT 1 kit by Does not apply route daily 1 kit 0    glucose monitoring kit (FREESTYLE) monitoring kit 1 kit by Does not apply route daily 1 kit 0     No current facility-administered medications for this visit. Allergies   Allergen Reactions    Hepatitis B Virus Vaccines Other (See Comments)     Skin ulcer    Amlodipine Besylate Rash    Hepatitis B Vaccine Other (See Comments)     Skin ulcer  Skin ulcer  Skin ulcer       Objective:   Physical Exam  Genitourinary:      Vulva and vagina normal.      No signs of injury in the vagina. Chest:      Breasts:         Right: Normal. No swelling, bleeding, inverted nipple, mass, nipple discharge, skin change or tenderness. Left: No swelling, bleeding, inverted nipple, mass, nipple discharge, skin change or tenderness. Pessary removed, cleaned with warm water and re-inserted with lubricant. Pt reports that it's in a comfortable position. Assessment:      1. Encounter for pessary maintenance    2. Encounter to establish care with new doctor    3. Encounter for screening mammogram for malignant neoplasm of breast          Plan:      Patient is a 64 y.o.  No obstetric history on file. Seen with total face to face time of 30 minutes. More than 50% of this visit was on counseling and education regarding her    Diagnosis Orders   1. Encounter for pessary maintenance     2. Encounter to establish care with new doctor     3. Encounter for screening mammogram for malignant neoplasm of breast  ÓSCAR MITCHELL DIGITAL SCREEN BILATERAL    and her options. She was also counseled on her preventative health maintenance recommendations and follow-up of routine pessary maintenance. . Refer to plan and assessment.     Recent Results (from the past 8736 hour(s))   TSH without Reflex    Collection Time: 09/28/20  8:05 AM   Result Value Ref Range    TSH 1.43 0.30 - 5.00 mIU/L   Basic Metabolic Panel    Collection Time: 09/28/20  8:05 AM   Result Value Ref Range    Glucose 123 (H) 70 - 99 mg/dL    BUN 59 (H) 8 - 23 mg/dL    CREATININE 2.00 (H) 0.50 - 0.90 mg/dL    Bun/Cre Ratio NOT REPORTED 9 - 20    Calcium 9.5 8.6 - 10.4 mg/dL    Sodium 144 135 - 144 mmol/L    Potassium 3.5 (L) 3.7 - 5.3 mmol/L    Chloride 104 98 - 107 mmol/L    CO2 24 20 - 31 mmol/L    Anion Gap 16 9 - 17 mmol/L    GFR Non-African American 24 (L) >60 mL/min    GFR  29 (L) >60 mL/min    GFR Comment          GFR Staging NOT REPORTED    Phosphorus    Collection Time: 09/28/20  8:05 AM   Result Value Ref Range    Phosphorus 4.1 2.6 - 4.5 mg/dL   PTH, Intact    Collection Time: 09/28/20  8:05 AM   Result Value Ref Range    Pth Intact 109.7 (H) 15.0 - 65.0 pg/mL   Urinalysis Reflex to Culture    Collection Time: 09/28/20  8:05 AM   Result Value Ref Range    Color, UA LT YELLOW YELLOW    Turbidity UA 2+ (A) CLEAR    Glucose, Ur NEGATIVE NEGATIVE    Bilirubin Urine NEGATIVE NEGATIVE    Ketones, Urine NEGATIVE NEGATIVE    Specific Gravity, UA 1.025 1.005 - 1.030    Urine Hgb SMALL (A) NEGATIVE    pH, UA 5.5 5.0 - 8.0    Protein, UA 1+ (A) NEGATIVE    Urobilinogen, Urine Normal Normal    Nitrite, Urine NEGATIVE NEGATIVE Leukocyte Esterase, Urine LARGE (A) NEGATIVE    Urinalysis Comments NOT REPORTED    Microscopic Urinalysis    Collection Time: 09/28/20  8:05 AM   Result Value Ref Range    -          WBC, UA 50  0 - 5 /HPF    RBC, UA 2 TO 5 0 - 2 /HPF    Casts UA NOT REPORTED 0 - 2 /LPF    Crystals, UA NOT REPORTED None /HPF    Epithelial Cells UA 50  0 - 5 /HPF    Renal Epithelial, UA NOT REPORTED 0 /HPF    Bacteria, UA MANY (A) None    Mucus, UA NOT REPORTED None    Trichomonas, UA NOT REPORTED None    Amorphous, UA NOT REPORTED None    Other Observations UA NOT REPORTED NOT REQ. Yeast, UA NOT REPORTED None   Culture, Urine    Collection Time: 09/28/20  2:53 PM    Specimen: Urine, clean catch   Result Value Ref Range    Specimen Description . CLEAN CATCH URINE     Special Requests NOT REPORTED     Culture ESCHERICHIA COLI >[id number] CFU/ML (A)        Susceptibility    Escherichia coli - BACTERIAL SUSCEPTIBILITY PANEL ELIAZAR     amikacin Value in next row        NOT REPORTED     ampicillin Value in next row Intermediate       16INTERMEDIATE     ampicillin-sulbactam Value in next row        NOT REPORTED     aztreonam Value in next row Sensitive       <=1SUSCEPTIBLE     ceFAZolin Value in next row Sensitive       <=4SUSCEPTIBLE     ceFAZolin Value in next row Sensitive       <=4SUSCEPTIBLE     cefepime Value in next row        NOT REPORTED     cefTRIAXone Value in next row Sensitive       <=1SUSCEPTIBLE     ciprofloxacin Value in next row Sensitive       <=0.25SUSCEPTIBLE     ertapenem Value in next row        NOT REPORTED     Confirmatory Extended Spectrum Beta-Lactamase Value in next row Negative       NOT REPORTED     gentamicin Value in next row Sensitive       <=1SUSCEPTIBLE     meropenem Value in next row        NOT REPORTED     nitrofurantoin Value in next row Sensitive       32SUSCEPTIBLE     tigecycline Value in next row        NOT REPORTED     tobramycin Value in next row Sensitive       <=1SUSCEPTIBLE trimethoprim-sulfamethoxazole Value in next row Sensitive       <=20SUSCEPTIBLE     piperacillin-tazobactam Value in next row Sensitive       <=4SUSCEPTIBLE   POCT glycosylated hemoglobin (Hb A1C)    Collection Time: 11/09/20  3:03 PM   Result Value Ref Range    Hemoglobin A1C 10.6 %   POCT glycosylated hemoglobin (Hb A1C)    Collection Time: 02/10/21  8:44 AM   Result Value Ref Range    Hemoglobin A1C 9.4 %   EKG 12 Lead    Collection Time: 02/24/21  6:20 PM   Result Value Ref Range    Ventricular Rate 82 BPM    Atrial Rate 82 BPM    P-R Interval 132 ms    QRS Duration 162 ms    Q-T Interval 454 ms    QTc Calculation (Bazett) 530 ms    R Axis -52 degrees    T Axis -48 degrees   CBC Auto Differential    Collection Time: 02/24/21  6:38 PM   Result Value Ref Range    WBC 6.2 3.5 - 11.0 k/uL    RBC 3.03 (L) 4.0 - 5.2 m/uL    Hemoglobin 8.4 (L) 12.0 - 16.0 g/dL    Hematocrit 25.6 (L) 36 - 46 %    MCV 84.5 80 - 100 fL    MCH 27.8 26 - 34 pg    MCHC 32.9 31 - 37 g/dL    RDW 16.4 (H) 12.5 - 15.4 %    Platelets 534 (L) 511 - 450 k/uL    MPV 11.4 6.0 - 12.0 fL    NRBC Automated NOT REPORTED per 100 WBC    Differential Type NOT REPORTED     Seg Neutrophils 60 36 - 66 %    Lymphocytes 31 24 - 44 %    Monocytes 9 2 - 11 %    Eosinophils % 0 (L) 1 - 4 %    Basophils 0 0 - 2 %    Immature Granulocytes NOT REPORTED 0 %    Segs Absolute 3.70 1.8 - 7.7 k/uL    Absolute Lymph # 1.90 1.0 - 4.8 k/uL    Absolute Mono # 0.60 0.1 - 1.2 k/uL    Absolute Eos # 0.00 0.0 - 0.4 k/uL    Basophils Absolute 0.00 0.0 - 0.2 k/uL    Absolute Immature Granulocyte NOT REPORTED 0.00 - 0.30 k/uL    WBC Morphology NOT REPORTED     RBC Morphology NOT REPORTED     Platelet Estimate NOT REPORTED    Comprehensive Metabolic Panel    Collection Time: 02/24/21  6:38 PM   Result Value Ref Range    Glucose 259 (H) 70 - 99 mg/dL    BUN 60 (H) 8 - 23 mg/dL    CREATININE 2.70 (H) 0.50 - 0.90 mg/dL    Bun/Cre Ratio NOT REPORTED 9 - 20    Calcium 7.6 (L) 8.6 - 10.4 mg/dL    Sodium 138 135 - 144 mmol/L    Potassium 3.8 3.7 - 5.3 mmol/L    Chloride 106 98 - 107 mmol/L    CO2 17 (L) 20 - 31 mmol/L    Anion Gap 15 9 - 17 mmol/L    Alkaline Phosphatase 112 (H) 35 - 104 U/L    ALT 15 5 - 33 U/L    AST 36 (H) <32 U/L    Total Bilirubin 0.50 0.3 - 1.2 mg/dL    Total Protein 6.4 6.4 - 8.3 g/dL    Albumin 3.4 (L) 3.5 - 5.2 g/dL    Albumin/Globulin Ratio 1.1 1.0 - 2.5    GFR Non-African American 17 (L) >60 mL/min    GFR  21 (L) >60 mL/min    GFR Comment          GFR Staging NOT REPORTED    Protime-INR    Collection Time: 02/24/21  6:38 PM   Result Value Ref Range    Protime 11.7 9.4 - 12.6 sec    INR 1.1    Troponin    Collection Time: 02/24/21  6:38 PM   Result Value Ref Range    Troponin, High Sensitivity 517 (HH) 0 - 14 ng/L    Troponin T NOT REPORTED <0.03 ng/mL    Troponin Interp NOT REPORTED    Brain Natriuretic Peptide    Collection Time: 02/24/21  6:38 PM   Result Value Ref Range    Pro-BNP 57,760 (H) <300 pg/mL    BNP Interpretation Pro-BNP Reference Range:    Culture, Blood 1    Collection Time: 02/24/21  6:38 PM    Specimen: Blood   Result Value Ref Range    Specimen Description . BLOOD     Special Requests RIGHT ANTECUBE 12CC     Culture NO GROWTH 6 DAYS    SPECIMEN REJECTION    Collection Time: 02/24/21  6:38 PM   Result Value Ref Range    Specimen Source . BLOOD     Ordered Test LAC     Reason for Rejection Unable to perform testing: No specimen received. - NOT REPORTED    Culture, Blood 1    Collection Time: 02/24/21  7:12 PM    Specimen: Blood   Result Value Ref Range    Specimen Description . BLOOD     Special Requests LEFT FOREARM 12CC     Culture NO GROWTH 6 DAYS    Lactic Acid    Collection Time: 02/24/21  7:12 PM   Result Value Ref Range    Lactic Acid 1.0 0.5 - 2.2 mmol/L   COVID-19, Rapid    Collection Time: 02/24/21  7:20 PM    Specimen: Nasopharyngeal Swab   Result Value Ref Range    Specimen Description . NASOPHARYNGEAL SWAB     SARS-CoV-2, Rapid Not Detected Not Detected   EKG 12 Lead    Collection Time: 02/24/21  8:38 PM   Result Value Ref Range    Ventricular Rate 86 BPM    Atrial Rate 86 BPM    P-R Interval 140 ms    QRS Duration 168 ms    Q-T Interval 458 ms    QTc Calculation (Bazett) 548 ms    P Axis -13 degrees    R Axis -49 degrees    T Axis -19 degrees   Troponin    Collection Time: 02/24/21  8:40 PM   Result Value Ref Range    Troponin, High Sensitivity 546 (HH) 0 - 14 ng/L    Troponin T NOT REPORTED <0.03 ng/mL    Troponin Interp NOT REPORTED    Hemoglobin A1C    Collection Time: 04/06/21 12:00 AM   Result Value Ref Range    Hemoglobin A1C 7.7 %    AVERAGE GLUCOSE     Hemoglobin A1C    Collection Time: 04/06/21 12:00 AM   Result Value Ref Range    Hemoglobin A1C 7.7 %    AVERAGE GLUCOSE     Comprehensive Metabolic Panel, Fasting    Collection Time: 08/11/21 12:00 AM   Result Value Ref Range    Glucose, Fasting 86 mg/dL    CREATININE 2.07 (H)     BUN 70 (H) mg/dL    Sodium 143 mmol/L    Potassium 3.9 mmol/L    Chloride 102 mmol/L    CO2 27 mmol/L    Calcium 9.9 mg/dL    AST 23 U/L    Alkaline Phosphatase 72 U/L    Total Protein 7.2 6.4 - 8.2 g/dL    Albumin 4.1     Total Bilirubin 0.5 0.1 - 1.4 mg/dL    ALT 17 U/L   Lipid Panel    Collection Time: 08/11/21 12:00 AM   Result Value Ref Range    Cholesterol, Total 118 (L) mg/dL    HDL 42 35 - 70 mg/dL    LDL Calculated 43 0 - 160 mg/dL    Triglycerides 167 (H) mg/dL    Chol/HDL Ratio 2.8     VLDL 33 mg/dL    Cholesterol non HDL     POCT glycosylated hemoglobin (Hb A1C)    Collection Time: 08/11/21  9:39 AM   Result Value Ref Range    Hemoglobin A1C 7.4 %         PLAN:   Mammogram ordered  RTO every other month for pessary cleaning [FreeTextEntry1] : MRI cervical spine 4/6/23: moderate left foraminal stenosis at C2-3, C5-6 and C6-7. MRI lumbar spine 02/16/23: multilevel spondylosis, disc bulges and mild spinal/foraminal stenosis.

## 2024-11-30 NOTE — PROGRESS NOTES
[facility] [place]  [address]  [address]  [place] [address]  Dept: [phone]    Chief Complaint   Patient presents with    Other       Cynkenneth Colmenares is an 80 yo female who presents for pessary maintenance.  She was last here 5 months ago.  She says she is doing well and not having any problems.  She denies any vaginal bleeding or abnormal vaginal discharge.  She is still happy with her pessary.    Review of Systems   Constitutional:  Negative for chills and fever.   HENT:  Negative for congestion.    Respiratory:  Negative for cough and shortness of breath.    Cardiovascular:  Negative for chest pain and palpitations.   Gastrointestinal:  Negative for abdominal pain.   Genitourinary:  Negative for pelvic pain and vaginal discharge.   Musculoskeletal:  Positive for arthralgias and back pain.   Neurological:  Negative for dizziness and light-headedness.   Psychiatric/Behavioral:  The patient is not nervous/anxious.        Past Medical History:   Diagnosis Date    CRF (chronic renal failure)     Diabetes mellitus (HCC)     Gout     Heart attack (HCC) 02/2021    Hyperlipidemia     Hypertension     Thyroid disease        Current Outpatient Medications on File Prior to Visit   Medication Sig Dispense Refill    levothyroxine (SYNTHROID) 100 MCG tablet Take 1 tablet by mouth daily 90 tablet 1    gabapentin (NEURONTIN) 100 MG capsule Take 1 capsule by mouth in the morning, at noon, and at bedtime for 90 days. 270 capsule 0    ONETOUCH ULTRA strip TEST 3 TIMES DAILY AND AS NEEDED FOR SYMPTOMS OF IRREGULAR BLOOD  GLUCOSE 300 strip 3    rosuvastatin (CRESTOR) 10 MG tablet TAKE 1 TABLET BY MOUTH DAILY 90 tablet 3    LANTUS SOLOSTAR 100 UNIT/ML injection pen       midodrine (PROAMATINE) 5 MG tablet 1 tablet      famotidine (PEPCID) 20 MG tablet TAKE 1 TABLET BY MOUTH TWICE  DAILY 180 tablet 3    carvedilol (COREG) 25 MG tablet TAKE 1 TABLET BY

## 2024-12-02 ENCOUNTER — OFFICE VISIT (OUTPATIENT)
Dept: OBGYN CLINIC | Age: 79
End: 2024-12-02
Payer: MEDICARE

## 2024-12-02 VITALS — HEIGHT: 65 IN | WEIGHT: 164 LBS | BODY MASS INDEX: 27.32 KG/M2

## 2024-12-02 DIAGNOSIS — Z46.89 PESSARY MAINTENANCE: Primary | ICD-10-CM

## 2024-12-02 PROCEDURE — 99202 OFFICE O/P NEW SF 15 MIN: CPT | Performed by: OBSTETRICS & GYNECOLOGY

## 2024-12-02 PROCEDURE — 1123F ACP DISCUSS/DSCN MKR DOCD: CPT | Performed by: OBSTETRICS & GYNECOLOGY

## 2024-12-02 ASSESSMENT — ENCOUNTER SYMPTOMS
ABDOMINAL PAIN: 0
COUGH: 0
BACK PAIN: 1
SHORTNESS OF BREATH: 0

## 2025-04-09 NOTE — PROGRESS NOTES
[facility]  [address]  [address]  [place] [address]  Dept: [phone]    Chief Complaint   Patient presents with    Follow-up     Pessary Maintenance       Cyn Colmenares is an 78 yo female who presents for pessary maintenance.  She was last here 3 months ago.  She says she is doing well, but has been having some brownish discharge for about 1-2 weeks a month or so ago.  She denies any discharge since then.  She is not having and not having any other problems.  She is still happy with her pessary.    Review of Systems   Constitutional:  Negative for chills and fatigue.   HENT:  Negative for congestion.    Respiratory:  Negative for cough and shortness of breath.    Cardiovascular:  Negative for chest pain and palpitations.   Gastrointestinal:  Negative for abdominal pain.   Genitourinary:  Positive for vaginal discharge. Negative for pelvic pain and vaginal bleeding.   Musculoskeletal:  Negative for back pain.   Neurological:  Negative for dizziness and light-headedness.   Psychiatric/Behavioral:  The patient is not nervous/anxious.        Past Medical History:   Diagnosis Date    CRF (chronic renal failure)     Diabetes mellitus (HCC)     Gout     Heart attack (HCC) 02/2021    Hyperlipidemia     Hypertension     Thyroid disease        Current Outpatient Medications on File Prior to Visit   Medication Sig Dispense Refill    levothyroxine (SYNTHROID) 100 MCG tablet Take 1 tablet by mouth daily 90 tablet 1    gabapentin (NEURONTIN) 100 MG capsule Take 1 capsule by mouth in the morning, at noon, and at bedtime for 90 days. 270 capsule 0    ONETOUCH ULTRA strip TEST 3 TIMES DAILY AND AS NEEDED FOR SYMPTOMS OF IRREGULAR BLOOD  GLUCOSE 300 strip 3    rosuvastatin (CRESTOR) 10 MG tablet TAKE 1 TABLET BY MOUTH DAILY 90 tablet 3    LANTUS SOLOSTAR 100 UNIT/ML injection pen       midodrine (PROAMATINE) 5 MG tablet 1 tablet

## 2025-04-11 ENCOUNTER — OFFICE VISIT (OUTPATIENT)
Dept: OBGYN CLINIC | Age: 80
End: 2025-04-11

## 2025-04-11 VITALS
BODY MASS INDEX: 25.83 KG/M2 | HEART RATE: 62 BPM | WEIGHT: 155 LBS | HEIGHT: 65 IN | DIASTOLIC BLOOD PRESSURE: 86 MMHG | SYSTOLIC BLOOD PRESSURE: 142 MMHG

## 2025-04-11 DIAGNOSIS — Z46.89 ENCOUNTER FOR PESSARY MAINTENANCE: Primary | ICD-10-CM

## 2025-04-11 ASSESSMENT — ENCOUNTER SYMPTOMS
ABDOMINAL PAIN: 0
SHORTNESS OF BREATH: 0
BACK PAIN: 0
COUGH: 0

## 2025-07-05 ENCOUNTER — HOSPITAL ENCOUNTER (EMERGENCY)
Facility: CLINIC | Age: 80
Discharge: HOME | End: 2025-07-05
Attending: EMERGENCY MEDICINE
Payer: MEDICARE

## 2025-07-05 VITALS
HEIGHT: 65 IN | RESPIRATION RATE: 17 BRPM | OXYGEN SATURATION: 97 % | TEMPERATURE: 98.2 F | HEART RATE: 61 BPM | BODY MASS INDEX: 25.99 KG/M2 | DIASTOLIC BLOOD PRESSURE: 47 MMHG | SYSTOLIC BLOOD PRESSURE: 188 MMHG | WEIGHT: 156 LBS

## 2025-07-05 DIAGNOSIS — L02.612 ABSCESS OR CELLULITIS OF TOE, LEFT: Primary | ICD-10-CM

## 2025-07-05 DIAGNOSIS — L03.032 ABSCESS OR CELLULITIS OF TOE, LEFT: Primary | ICD-10-CM

## 2025-07-05 PROCEDURE — 99283 EMERGENCY DEPT VISIT LOW MDM: CPT

## 2025-07-05 PROCEDURE — 6370000000 HC RX 637 (ALT 250 FOR IP): Performed by: EMERGENCY MEDICINE

## 2025-07-05 RX ORDER — DOXYCYCLINE 100 MG/1
100 CAPSULE ORAL ONCE
Status: COMPLETED | OUTPATIENT
Start: 2025-07-05 | End: 2025-07-05

## 2025-07-05 RX ORDER — DOXYCYCLINE HYCLATE 100 MG
100 TABLET ORAL 2 TIMES DAILY
Qty: 14 TABLET | Refills: 0 | Status: SHIPPED | OUTPATIENT
Start: 2025-07-05 | End: 2025-07-12

## 2025-07-05 RX ADMIN — DOXYCYCLINE 100 MG: 100 CAPSULE ORAL at 15:48

## 2025-07-05 ASSESSMENT — PAIN DESCRIPTION - LOCATION: LOCATION: TOE (COMMENT WHICH ONE)

## 2025-07-05 ASSESSMENT — PAIN SCALES - GENERAL: PAINLEVEL_OUTOF10: 6

## 2025-07-05 ASSESSMENT — PAIN DESCRIPTION - ORIENTATION: ORIENTATION: RIGHT

## 2025-07-05 ASSESSMENT — PAIN - FUNCTIONAL ASSESSMENT: PAIN_FUNCTIONAL_ASSESSMENT: 0-10

## 2025-07-05 ASSESSMENT — PAIN DESCRIPTION - DESCRIPTORS: DESCRIPTORS: ACHING;SHARP

## 2025-07-05 NOTE — DISCHARGE INSTRUCTIONS
Please make an appointment to follow up with your primary doctor and/or the specialist as we discussed. Take all medications as prescribed.  Return to ER if condition worsens or you develop any new/concerning symptoms as we discussed.    Consider warm salt water soaks for 15 to 20 minutes twice a day as we discussed

## 2025-07-05 NOTE — ED PROVIDER NOTES
[facility]  [address] [id number]  Phone: [phone]        [facility] EMERGENCY DEPARTMENT  EMERGENCY DEPARTMENT ENCOUNTER      Pt Name: Cyn Colmenares  MRN: [id number]  Birthdate 1945  Date of evaluation: 7/5/2025  Provider: Marshall Chacon DO    CHIEF COMPLAINT       Chief Complaint   Patient presents with    Toe Pain     Right great toe, noticed this afternoon.          HISTORY OF PRESENT ILLNESS   (Location/Symptom, Timing/Onset,Context/Setting, Quality, Duration, Modifying Factors, Severity)  Note limiting factors.   Cyn Colmenares is a 80 y.o. female who presents to the emergency department for the evaluation of pain in the great toe on her right foot.  She noticed that this afternoon.  She does have a callus on there and she sees podiatry to get that shaved.  She also noticed a little bloody discharge in her shoe.  No fever.  She is diabetic.  No trauma.    Nursing Notes were reviewed.    REVIEW OF SYSTEMS    (2-9systems for level 4, 10 or more for level 5)     Review of Systems   Constitutional:  Negative for fever.   Skin:  Positive for wound.   Neurological:  Negative for weakness.       Except asnoted above the remainder of the review of systems was reviewed and negative.       PAST MEDICAL HISTORY     Past Medical History:   Diagnosis Date    CRF (chronic renal failure)     Diabetes mellitus (HCC)     Gout     Heart attack (HCC) 02/2021    Hyperlipidemia     Hypertension     Thyroid disease          SURGICAL HISTORY       Past Surgical History:   Procedure Laterality Date    BACK SURGERY      Lumbar herniated disc    CARDIAC SURGERY      CABG x4    CARPAL TUNNEL RELEASE      CATARACT REMOVAL      CORONARY ARTERY BYPASS GRAFT  04/13/2021    x4, Dr. Hubbard    FOOT SURGERY      LIPOMA RESECTION           CURRENT MEDICATIONS     Previous Medications    ASPIRIN 81 MG TABLET    Take 1 tablet by mouth daily    CARVEDILOL (COREG) 25 MG TABLET    TAKE 1 TABLET

## 2025-07-12 NOTE — PROGRESS NOTES
[facility]  [address]  [address]  [place] [address]  Dept: [phone]    Chief Complaint   Patient presents with    Other       Cynkenneth Colmenares is a 79 yo female who presents for pessary maintenance.  She was last here 3 months ago.  She says she is doing well but feels a bit off today after her dialysis.  She denies any vaginal bleeding or abnormal vaginal discharge.  She is still happy with her pessary.    Review of Systems   Constitutional:  Negative for chills and fever.   HENT:  Negative for congestion.    Respiratory:  Negative for cough and shortness of breath.    Cardiovascular:  Negative for chest pain and palpitations.   Gastrointestinal:  Negative for abdominal pain.   Genitourinary:  Negative for pelvic pain and vaginal discharge.   Musculoskeletal:  Negative for back pain.   Neurological:  Positive for dizziness and light-headedness.   Psychiatric/Behavioral:  The patient is not nervous/anxious.        Past Medical History:   Diagnosis Date    CRF (chronic renal failure)     Diabetes mellitus (HCC)     Gout     Heart attack (HCC) 02/2021    Hyperlipidemia     Hypertension     Thyroid disease        Current Outpatient Medications on File Prior to Visit   Medication Sig Dispense Refill    levothyroxine (SYNTHROID) 100 MCG tablet Take 1 tablet by mouth daily 90 tablet 1    ONETOUCH ULTRA strip TEST 3 TIMES DAILY AND AS NEEDED FOR SYMPTOMS OF IRREGULAR BLOOD  GLUCOSE 300 strip 3    rosuvastatin (CRESTOR) 10 MG tablet TAKE 1 TABLET BY MOUTH DAILY 90 tablet 3    LANTUS SOLOSTAR 100 UNIT/ML injection pen       midodrine (PROAMATINE) 5 MG tablet 1 tablet      famotidine (PEPCID) 20 MG tablet TAKE 1 TABLET BY MOUTH TWICE  DAILY 180 tablet 3    carvedilol (COREG) 25 MG tablet TAKE 1 TABLET BY MOUTH TWICE  DAILY WITH MEALS 180 tablet 3    Insulin Degludec (TRESIBA FLEXTOUCH) 100 UNIT/ML SOPN Inject 22 Units into the skin daily

## 2025-07-14 ENCOUNTER — OFFICE VISIT (OUTPATIENT)
Dept: OBGYN CLINIC | Age: 80
End: 2025-07-14
Payer: MEDICARE

## 2025-07-14 VITALS
BODY MASS INDEX: 26.13 KG/M2 | HEART RATE: 63 BPM | WEIGHT: 157 LBS | SYSTOLIC BLOOD PRESSURE: 175 MMHG | DIASTOLIC BLOOD PRESSURE: 67 MMHG

## 2025-07-14 DIAGNOSIS — Z46.89 PESSARY MAINTENANCE: Primary | ICD-10-CM

## 2025-07-14 PROCEDURE — 1159F MED LIST DOCD IN RCRD: CPT | Performed by: OBSTETRICS & GYNECOLOGY

## 2025-07-14 PROCEDURE — 1123F ACP DISCUSS/DSCN MKR DOCD: CPT | Performed by: OBSTETRICS & GYNECOLOGY

## 2025-07-14 PROCEDURE — 99212 OFFICE O/P EST SF 10 MIN: CPT | Performed by: OBSTETRICS & GYNECOLOGY

## 2025-07-14 ASSESSMENT — ENCOUNTER SYMPTOMS
SHORTNESS OF BREATH: 0
COUGH: 0
ABDOMINAL PAIN: 0
BACK PAIN: 0

## 2025-08-18 ENCOUNTER — CARE COORDINATION (OUTPATIENT)
Dept: CARE COORDINATION | Age: 80
End: 2025-08-18

## 2025-08-22 ENCOUNTER — HOSPITAL ENCOUNTER (OUTPATIENT)
Age: 80
Setting detail: SPECIMEN
Discharge: HOME | End: 2025-08-22

## 2025-08-25 ENCOUNTER — HOSPITAL ENCOUNTER (OUTPATIENT)
Age: 80
Setting detail: SPECIMEN
Discharge: HOME | End: 2025-08-25

## 2025-08-26 ENCOUNTER — HOSPITAL ENCOUNTER (OUTPATIENT)
Age: 80
Setting detail: SPECIMEN
Discharge: HOME | End: 2025-08-26

## 2025-08-26 LAB
ALBUMIN SERPL-MCNC: 3 G/DL (ref 3.5–5.2)
ALBUMIN/GLOB SERPL: 0.9 {RATIO} (ref 1–2.5)
ALP SERPL-CCNC: 142 U/L (ref 35–104)
ALT SERPL-CCNC: 48 U/L (ref 10–35)
AST SERPL-CCNC: 45 U/L (ref 10–35)
BILIRUB DIRECT SERPL-MCNC: 0.2 MG/DL (ref 0–0.2)
BILIRUB INDIRECT SERPL-MCNC: 0.2 MG/DL
BILIRUB SERPL-MCNC: 0.3 MG/DL (ref 0–1.2)
CREAT SERPL-MCNC: 2.7 MG/DL (ref 0.5–0.9)
GFR, ESTIMATED: 17 ML/MIN/1.73M2
PLATELET # BLD AUTO: 398 K/UL (ref 138–453)
PROT SERPL-MCNC: 6.3 G/DL (ref 6.6–8.7)
VANCOMYCIN TROUGH SERPL-MCNC: 11.9 UG/ML (ref 10–20)
WBC OTHER # BLD: 9 K/UL (ref 3.5–11.3)

## 2025-08-26 PROCEDURE — 85049 AUTOMATED PLATELET COUNT: CPT

## 2025-08-26 PROCEDURE — 80202 ASSAY OF VANCOMYCIN: CPT

## 2025-08-26 PROCEDURE — 80076 HEPATIC FUNCTION PANEL: CPT

## 2025-08-26 PROCEDURE — 36415 COLL VENOUS BLD VENIPUNCTURE: CPT

## 2025-08-26 PROCEDURE — 82565 ASSAY OF CREATININE: CPT

## 2025-08-26 PROCEDURE — 85048 AUTOMATED LEUKOCYTE COUNT: CPT

## 2025-08-27 ENCOUNTER — HOSPITAL ENCOUNTER (OUTPATIENT)
Age: 80
Setting detail: SPECIMEN
Discharge: HOME | End: 2025-08-27

## 2025-08-27 ENCOUNTER — CARE COORDINATION (OUTPATIENT)
Dept: CARE COORDINATION | Age: 80
End: 2025-08-27

## 2025-08-27 LAB
ALBUMIN SERPL-MCNC: 3.1 G/DL (ref 3.5–5.2)
ALBUMIN/GLOB SERPL: 0.9 {RATIO} (ref 1–2.5)
ALP SERPL-CCNC: 138 U/L (ref 35–104)
ALT SERPL-CCNC: 46 U/L (ref 10–35)
AST SERPL-CCNC: 50 U/L (ref 10–35)
BILIRUB DIRECT SERPL-MCNC: 0.1 MG/DL (ref 0–0.2)
BILIRUB INDIRECT SERPL-MCNC: 0.2 MG/DL
BILIRUB SERPL-MCNC: 0.3 MG/DL (ref 0–1.2)
CREAT SERPL-MCNC: 1.7 MG/DL (ref 0.5–0.9)
GFR, ESTIMATED: 31 ML/MIN/1.73M2
PLATELET # BLD AUTO: 376 K/UL (ref 138–453)
PROT SERPL-MCNC: 6.6 G/DL (ref 6.6–8.7)
VANCOMYCIN TROUGH SERPL-MCNC: 20 UG/ML (ref 10–20)
WBC OTHER # BLD: 7.4 K/UL (ref 3.5–11.3)

## 2025-08-27 PROCEDURE — 85049 AUTOMATED PLATELET COUNT: CPT

## 2025-08-27 PROCEDURE — 80076 HEPATIC FUNCTION PANEL: CPT

## 2025-08-27 PROCEDURE — 82565 ASSAY OF CREATININE: CPT

## 2025-08-27 PROCEDURE — 85048 AUTOMATED LEUKOCYTE COUNT: CPT

## 2025-08-27 PROCEDURE — 36415 COLL VENOUS BLD VENIPUNCTURE: CPT

## 2025-08-27 PROCEDURE — 80202 ASSAY OF VANCOMYCIN: CPT

## 2025-08-29 ENCOUNTER — HOSPITAL ENCOUNTER (OUTPATIENT)
Age: 80
Setting detail: SPECIMEN
Discharge: HOME | End: 2025-08-29

## 2025-09-02 ENCOUNTER — HOSPITAL ENCOUNTER (OUTPATIENT)
Age: 80
Setting detail: SPECIMEN
Discharge: HOME | End: 2025-09-02

## 2025-09-02 LAB
ALBUMIN SERPL-MCNC: 3.3 G/DL (ref 3.5–5.2)
ALBUMIN/GLOB SERPL: 1 {RATIO} (ref 1–2.5)
ALP SERPL-CCNC: 133 U/L (ref 35–104)
ALT SERPL-CCNC: 31 U/L (ref 10–35)
AST SERPL-CCNC: 31 U/L (ref 10–35)
BILIRUB DIRECT SERPL-MCNC: 0.1 MG/DL (ref 0–0.2)
BILIRUB INDIRECT SERPL-MCNC: 0.2 MG/DL
BILIRUB SERPL-MCNC: 0.3 MG/DL (ref 0–1.2)
CREAT SERPL-MCNC: 2.8 MG/DL (ref 0.5–0.9)
GFR, ESTIMATED: 16 ML/MIN/1.73M2
PLATELET # BLD AUTO: 321 K/UL (ref 138–453)
PROT SERPL-MCNC: 6.7 G/DL (ref 6.6–8.7)
VANCOMYCIN TROUGH SERPL-MCNC: 13 UG/ML (ref 10–20)
WBC OTHER # BLD: 7.6 K/UL (ref 3.5–11.3)

## 2025-09-02 PROCEDURE — 36415 COLL VENOUS BLD VENIPUNCTURE: CPT

## 2025-09-02 PROCEDURE — 85048 AUTOMATED LEUKOCYTE COUNT: CPT

## 2025-09-02 PROCEDURE — 80202 ASSAY OF VANCOMYCIN: CPT

## 2025-09-02 PROCEDURE — 85049 AUTOMATED PLATELET COUNT: CPT

## 2025-09-02 PROCEDURE — 80076 HEPATIC FUNCTION PANEL: CPT

## 2025-09-02 PROCEDURE — 82565 ASSAY OF CREATININE: CPT

## 2025-09-03 ENCOUNTER — HOSPITAL ENCOUNTER (OUTPATIENT)
Age: 80
Setting detail: SPECIMEN
Discharge: HOME | End: 2025-09-03

## 2025-09-04 ENCOUNTER — CARE COORDINATION (OUTPATIENT)
Dept: CARE COORDINATION | Age: 80
End: 2025-09-04